# Patient Record
Sex: MALE | Race: AMERICAN INDIAN OR ALASKA NATIVE | NOT HISPANIC OR LATINO | Employment: FULL TIME | URBAN - METROPOLITAN AREA
[De-identification: names, ages, dates, MRNs, and addresses within clinical notes are randomized per-mention and may not be internally consistent; named-entity substitution may affect disease eponyms.]

---

## 2019-06-02 ENCOUNTER — HOSPITAL ENCOUNTER (INPATIENT)
Facility: MEDICAL CENTER | Age: 69
LOS: 2 days | DRG: 639 | End: 2019-06-04
Attending: EMERGENCY MEDICINE | Admitting: INTERNAL MEDICINE
Payer: COMMERCIAL

## 2019-06-02 ENCOUNTER — HOSPITAL ENCOUNTER (OUTPATIENT)
Dept: RADIOLOGY | Facility: MEDICAL CENTER | Age: 69
End: 2019-06-02

## 2019-06-02 DIAGNOSIS — E11.10 DIABETIC KETOACIDOSIS WITHOUT COMA ASSOCIATED WITH TYPE 2 DIABETES MELLITUS (HCC): ICD-10-CM

## 2019-06-02 PROBLEM — R11.2 NAUSEA AND VOMITING: Status: ACTIVE | Noted: 2019-06-02

## 2019-06-02 PROBLEM — R12 HEARTBURN: Status: ACTIVE | Noted: 2019-06-02

## 2019-06-02 LAB
ANION GAP SERPL CALC-SCNC: 16 MMOL/L (ref 0–11.9)
ANION GAP SERPL CALC-SCNC: 7 MMOL/L (ref 0–11.9)
ANION GAP SERPL CALC-SCNC: 7 MMOL/L (ref 0–11.9)
ANION GAP SERPL CALC-SCNC: 8 MMOL/L (ref 0–11.9)
ANION GAP SERPL CALC-SCNC: 9 MMOL/L (ref 0–11.9)
BASOPHILS # BLD AUTO: 0 % (ref 0–1.8)
BASOPHILS # BLD: 0 K/UL (ref 0–0.12)
BUN SERPL-MCNC: 25 MG/DL (ref 8–22)
BUN SERPL-MCNC: 27 MG/DL (ref 8–22)
BUN SERPL-MCNC: 29 MG/DL (ref 8–22)
BUN SERPL-MCNC: 32 MG/DL (ref 8–22)
BUN SERPL-MCNC: 37 MG/DL (ref 8–22)
CALCIUM SERPL-MCNC: 7.5 MG/DL (ref 8.5–10.5)
CALCIUM SERPL-MCNC: 7.6 MG/DL (ref 8.5–10.5)
CALCIUM SERPL-MCNC: 7.6 MG/DL (ref 8.5–10.5)
CALCIUM SERPL-MCNC: 7.8 MG/DL (ref 8.5–10.5)
CALCIUM SERPL-MCNC: 7.9 MG/DL (ref 8.5–10.5)
CHLORIDE SERPL-SCNC: 100 MMOL/L (ref 96–112)
CHLORIDE SERPL-SCNC: 104 MMOL/L (ref 96–112)
CHLORIDE SERPL-SCNC: 105 MMOL/L (ref 96–112)
CHLORIDE SERPL-SCNC: 105 MMOL/L (ref 96–112)
CHLORIDE SERPL-SCNC: 107 MMOL/L (ref 96–112)
CO2 SERPL-SCNC: 17 MMOL/L (ref 20–33)
CO2 SERPL-SCNC: 20 MMOL/L (ref 20–33)
CO2 SERPL-SCNC: 21 MMOL/L (ref 20–33)
CO2 SERPL-SCNC: 21 MMOL/L (ref 20–33)
CO2 SERPL-SCNC: 23 MMOL/L (ref 20–33)
CREAT SERPL-MCNC: 0.8 MG/DL (ref 0.5–1.4)
CREAT SERPL-MCNC: 0.8 MG/DL (ref 0.5–1.4)
CREAT SERPL-MCNC: 0.83 MG/DL (ref 0.5–1.4)
CREAT SERPL-MCNC: 0.89 MG/DL (ref 0.5–1.4)
CREAT SERPL-MCNC: 0.99 MG/DL (ref 0.5–1.4)
EKG IMPRESSION: NORMAL
EKG IMPRESSION: NORMAL
EOSINOPHIL # BLD AUTO: 0 K/UL (ref 0–0.51)
EOSINOPHIL NFR BLD: 0 % (ref 0–6.9)
ERYTHROCYTE [DISTWIDTH] IN BLOOD BY AUTOMATED COUNT: 46.8 FL (ref 35.9–50)
GLUCOSE BLD-MCNC: 298 MG/DL (ref 65–99)
GLUCOSE BLD-MCNC: 325 MG/DL (ref 65–99)
GLUCOSE BLD-MCNC: 357 MG/DL (ref 65–99)
GLUCOSE BLD-MCNC: 371 MG/DL (ref 65–99)
GLUCOSE SERPL-MCNC: 318 MG/DL (ref 65–99)
GLUCOSE SERPL-MCNC: 326 MG/DL (ref 65–99)
GLUCOSE SERPL-MCNC: 339 MG/DL (ref 65–99)
GLUCOSE SERPL-MCNC: 362 MG/DL (ref 65–99)
GLUCOSE SERPL-MCNC: 407 MG/DL (ref 65–99)
HCT VFR BLD AUTO: 35.4 % (ref 42–52)
HGB BLD-MCNC: 11.8 G/DL (ref 14–18)
LG PLATELETS BLD QL SMEAR: NORMAL
LYMPHOCYTES # BLD AUTO: 0.64 K/UL (ref 1–4.8)
LYMPHOCYTES NFR BLD: 6.1 % (ref 22–41)
MAGNESIUM SERPL-MCNC: 1.7 MG/DL (ref 1.5–2.5)
MANUAL DIFF BLD: NORMAL
MCH RBC QN AUTO: 30.3 PG (ref 27–33)
MCHC RBC AUTO-ENTMCNC: 33.3 G/DL (ref 33.7–35.3)
MCV RBC AUTO: 90.8 FL (ref 81.4–97.8)
METAMYELOCYTES NFR BLD MANUAL: 1.7 %
MONOCYTES # BLD AUTO: 0.46 K/UL (ref 0–0.85)
MONOCYTES NFR BLD AUTO: 4.4 % (ref 0–13.4)
MORPHOLOGY BLD-IMP: NORMAL
NEUTROPHILS # BLD AUTO: 9.22 K/UL (ref 1.82–7.42)
NEUTROPHILS NFR BLD: 87.8 % (ref 44–72)
NRBC # BLD AUTO: 0 K/UL
NRBC BLD-RTO: 0 /100 WBC
PHOSPHATE SERPL-MCNC: 3.4 MG/DL (ref 2.5–4.5)
PLATELET # BLD AUTO: 149 K/UL (ref 164–446)
PLATELET BLD QL SMEAR: NORMAL
PMV BLD AUTO: 11.1 FL (ref 9–12.9)
POTASSIUM SERPL-SCNC: 3.7 MMOL/L (ref 3.6–5.5)
POTASSIUM SERPL-SCNC: 3.9 MMOL/L (ref 3.6–5.5)
POTASSIUM SERPL-SCNC: 4.1 MMOL/L (ref 3.6–5.5)
POTASSIUM SERPL-SCNC: 4.1 MMOL/L (ref 3.6–5.5)
POTASSIUM SERPL-SCNC: 4.6 MMOL/L (ref 3.6–5.5)
RBC # BLD AUTO: 3.9 M/UL (ref 4.7–6.1)
RBC BLD AUTO: PRESENT
SODIUM SERPL-SCNC: 132 MMOL/L (ref 135–145)
SODIUM SERPL-SCNC: 133 MMOL/L (ref 135–145)
SODIUM SERPL-SCNC: 135 MMOL/L (ref 135–145)
TROPONIN I SERPL-MCNC: <0.01 NG/ML (ref 0–0.04)
WBC # BLD AUTO: 10.5 K/UL (ref 4.8–10.8)

## 2019-06-02 PROCEDURE — 93010 ELECTROCARDIOGRAM REPORT: CPT | Mod: 77 | Performed by: INTERNAL MEDICINE

## 2019-06-02 PROCEDURE — 700111 HCHG RX REV CODE 636 W/ 250 OVERRIDE (IP): Performed by: INTERNAL MEDICINE

## 2019-06-02 PROCEDURE — 85007 BL SMEAR W/DIFF WBC COUNT: CPT

## 2019-06-02 PROCEDURE — 93005 ELECTROCARDIOGRAM TRACING: CPT | Performed by: INTERNAL MEDICINE

## 2019-06-02 PROCEDURE — 770020 HCHG ROOM/CARE - TELE (206)

## 2019-06-02 PROCEDURE — 85027 COMPLETE CBC AUTOMATED: CPT

## 2019-06-02 PROCEDURE — 93005 ELECTROCARDIOGRAM TRACING: CPT | Performed by: EMERGENCY MEDICINE

## 2019-06-02 PROCEDURE — 84100 ASSAY OF PHOSPHORUS: CPT

## 2019-06-02 PROCEDURE — 700105 HCHG RX REV CODE 258: Performed by: EMERGENCY MEDICINE

## 2019-06-02 PROCEDURE — 99285 EMERGENCY DEPT VISIT HI MDM: CPT

## 2019-06-02 PROCEDURE — 84484 ASSAY OF TROPONIN QUANT: CPT

## 2019-06-02 PROCEDURE — 80048 BASIC METABOLIC PNL TOTAL CA: CPT | Mod: 91

## 2019-06-02 PROCEDURE — 36415 COLL VENOUS BLD VENIPUNCTURE: CPT

## 2019-06-02 PROCEDURE — 93010 ELECTROCARDIOGRAM REPORT: CPT | Performed by: INTERNAL MEDICINE

## 2019-06-02 PROCEDURE — 700102 HCHG RX REV CODE 250 W/ 637 OVERRIDE(OP): Performed by: INTERNAL MEDICINE

## 2019-06-02 PROCEDURE — 99223 1ST HOSP IP/OBS HIGH 75: CPT | Performed by: INTERNAL MEDICINE

## 2019-06-02 PROCEDURE — 83735 ASSAY OF MAGNESIUM: CPT

## 2019-06-02 PROCEDURE — 93005 ELECTROCARDIOGRAM TRACING: CPT

## 2019-06-02 PROCEDURE — 82962 GLUCOSE BLOOD TEST: CPT | Mod: 91

## 2019-06-02 RX ORDER — BISACODYL 10 MG
10 SUPPOSITORY, RECTAL RECTAL
Status: DISCONTINUED | OUTPATIENT
Start: 2019-06-02 | End: 2019-06-04 | Stop reason: HOSPADM

## 2019-06-02 RX ORDER — PREDNISONE 10 MG/1
10 TABLET ORAL DAILY
Status: ON HOLD | COMMUNITY
End: 2019-06-04

## 2019-06-02 RX ORDER — MAGNESIUM SULFATE HEPTAHYDRATE 40 MG/ML
4 INJECTION, SOLUTION INTRAVENOUS
Status: DISCONTINUED | OUTPATIENT
Start: 2019-06-02 | End: 2019-06-02

## 2019-06-02 RX ORDER — POLYETHYLENE GLYCOL 3350 17 G/17G
1 POWDER, FOR SOLUTION ORAL
Status: DISCONTINUED | OUTPATIENT
Start: 2019-06-02 | End: 2019-06-04 | Stop reason: HOSPADM

## 2019-06-02 RX ORDER — AMOXICILLIN 250 MG
2 CAPSULE ORAL 2 TIMES DAILY
Status: DISCONTINUED | OUTPATIENT
Start: 2019-06-02 | End: 2019-06-04 | Stop reason: HOSPADM

## 2019-06-02 RX ORDER — SODIUM CHLORIDE 9 MG/ML
2000 INJECTION, SOLUTION INTRAVENOUS ONCE
Status: DISCONTINUED | OUTPATIENT
Start: 2019-06-02 | End: 2019-06-02

## 2019-06-02 RX ORDER — SODIUM CHLORIDE, SODIUM LACTATE, POTASSIUM CHLORIDE, CALCIUM CHLORIDE 600; 310; 30; 20 MG/100ML; MG/100ML; MG/100ML; MG/100ML
INJECTION, SOLUTION INTRAVENOUS CONTINUOUS
Status: DISCONTINUED | OUTPATIENT
Start: 2019-06-02 | End: 2019-06-02

## 2019-06-02 RX ORDER — DEXTROSE AND SODIUM CHLORIDE 10; .45 G/100ML; G/100ML
INJECTION, SOLUTION INTRAVENOUS CONTINUOUS
Status: DISCONTINUED | OUTPATIENT
Start: 2019-06-02 | End: 2019-06-02

## 2019-06-02 RX ORDER — SODIUM CHLORIDE, SODIUM LACTATE, POTASSIUM CHLORIDE, AND CALCIUM CHLORIDE .6; .31; .03; .02 G/100ML; G/100ML; G/100ML; G/100ML
2000 INJECTION, SOLUTION INTRAVENOUS ONCE
Status: DISCONTINUED | OUTPATIENT
Start: 2019-06-02 | End: 2019-06-02

## 2019-06-02 RX ORDER — ACETAMINOPHEN 325 MG/1
650 TABLET ORAL EVERY 6 HOURS PRN
Status: DISCONTINUED | OUTPATIENT
Start: 2019-06-02 | End: 2019-06-04 | Stop reason: HOSPADM

## 2019-06-02 RX ORDER — SODIUM CHLORIDE 9 MG/ML
1000 INJECTION, SOLUTION INTRAVENOUS ONCE
Status: COMPLETED | OUTPATIENT
Start: 2019-06-02 | End: 2019-06-02

## 2019-06-02 RX ORDER — HEPARIN SODIUM 5000 [USP'U]/ML
5000 INJECTION, SOLUTION INTRAVENOUS; SUBCUTANEOUS EVERY 8 HOURS
Status: DISCONTINUED | OUTPATIENT
Start: 2019-06-02 | End: 2019-06-04 | Stop reason: HOSPADM

## 2019-06-02 RX ORDER — ONDANSETRON 4 MG/1
4 TABLET, ORALLY DISINTEGRATING ORAL EVERY 4 HOURS PRN
Status: DISCONTINUED | OUTPATIENT
Start: 2019-06-02 | End: 2019-06-04 | Stop reason: HOSPADM

## 2019-06-02 RX ORDER — ONDANSETRON 2 MG/ML
4 INJECTION INTRAMUSCULAR; INTRAVENOUS EVERY 4 HOURS PRN
Status: DISCONTINUED | OUTPATIENT
Start: 2019-06-02 | End: 2019-06-04 | Stop reason: HOSPADM

## 2019-06-02 RX ORDER — DEXTROSE, SODIUM CHLORIDE, SODIUM LACTATE, POTASSIUM CHLORIDE, AND CALCIUM CHLORIDE 5; .6; .31; .03; .02 G/100ML; G/100ML; G/100ML; G/100ML; G/100ML
INJECTION, SOLUTION INTRAVENOUS CONTINUOUS
Status: DISCONTINUED | OUTPATIENT
Start: 2019-06-02 | End: 2019-06-02

## 2019-06-02 RX ORDER — MAGNESIUM SULFATE HEPTAHYDRATE 40 MG/ML
2 INJECTION, SOLUTION INTRAVENOUS
Status: DISCONTINUED | OUTPATIENT
Start: 2019-06-02 | End: 2019-06-02

## 2019-06-02 RX ADMIN — HEPARIN SODIUM 5000 UNITS: 5000 INJECTION, SOLUTION INTRAVENOUS; SUBCUTANEOUS at 13:33

## 2019-06-02 RX ADMIN — HEPARIN SODIUM 5000 UNITS: 5000 INJECTION, SOLUTION INTRAVENOUS; SUBCUTANEOUS at 21:21

## 2019-06-02 RX ADMIN — INSULIN HUMAN 7 UNITS: 100 INJECTION, SOLUTION PARENTERAL at 17:23

## 2019-06-02 RX ADMIN — SODIUM CHLORIDE 1000 ML: 9 INJECTION, SOLUTION INTRAVENOUS at 04:43

## 2019-06-02 RX ADMIN — INSULIN HUMAN 12 UNITS: 100 INJECTION, SOLUTION PARENTERAL at 21:21

## 2019-06-02 RX ADMIN — INSULIN HUMAN 10 UNITS: 100 INJECTION, SOLUTION PARENTERAL at 11:50

## 2019-06-02 RX ADMIN — INSULIN HUMAN 10 UNITS: 100 INJECTION, SOLUTION PARENTERAL at 06:42

## 2019-06-02 ASSESSMENT — COGNITIVE AND FUNCTIONAL STATUS - GENERAL
SUGGESTED CMS G CODE MODIFIER MOBILITY: CH
DAILY ACTIVITIY SCORE: 24
SUGGESTED CMS G CODE MODIFIER DAILY ACTIVITY: CH
MOBILITY SCORE: 24

## 2019-06-02 ASSESSMENT — ENCOUNTER SYMPTOMS
SPUTUM PRODUCTION: 0
DIZZINESS: 0
FOCAL WEAKNESS: 0
PALPITATIONS: 0
NEUROLOGICAL NEGATIVE: 1
CHILLS: 0
DIARRHEA: 0
GASTROINTESTINAL NEGATIVE: 1
ABDOMINAL PAIN: 0
COUGH: 1
HEARTBURN: 1
BRUISES/BLEEDS EASILY: 0
CARDIOVASCULAR NEGATIVE: 1
FLANK PAIN: 0
MYALGIAS: 0
BLOOD IN STOOL: 0
HEADACHES: 0
SORE THROAT: 0
FEVER: 0
RESPIRATORY NEGATIVE: 1
SHORTNESS OF BREATH: 0
MUSCULOSKELETAL NEGATIVE: 1
CONSTITUTIONAL NEGATIVE: 1
NECK PAIN: 0
SEIZURES: 0
BACK PAIN: 0
WHEEZING: 0
NAUSEA: 1
DIAPHORESIS: 0
PSYCHIATRIC NEGATIVE: 1
VOMITING: 1
BLURRED VISION: 0

## 2019-06-02 ASSESSMENT — PATIENT HEALTH QUESTIONNAIRE - PHQ9
2. FEELING DOWN, DEPRESSED, IRRITABLE, OR HOPELESS: NOT AT ALL
SUM OF ALL RESPONSES TO PHQ9 QUESTIONS 1 AND 2: 0
1. LITTLE INTEREST OR PLEASURE IN DOING THINGS: NOT AT ALL

## 2019-06-02 ASSESSMENT — LIFESTYLE VARIABLES
EVER_SMOKED: YES
EVER_SMOKED: YES
DO YOU DRINK ALCOHOL: NO

## 2019-06-02 ASSESSMENT — COPD QUESTIONNAIRES
DO YOU EVER COUGH UP ANY MUCUS OR PHLEGM?: NO/ONLY WITH OCCASIONAL COLDS OR INFECTIONS
DO YOU EVER COUGH UP ANY MUCUS OR PHLEGM?: NO/ONLY WITH OCCASIONAL COLDS OR INFECTIONS
DURING THE PAST 4 WEEKS HOW MUCH DID YOU FEEL SHORT OF BREATH: SOME OF THE TIME
HAVE YOU SMOKED AT LEAST 100 CIGARETTES IN YOUR ENTIRE LIFE: YES
COPD SCREENING SCORE: 5
IN THE PAST 12 MONTHS DO YOU DO LESS THAN YOU USED TO BECAUSE OF YOUR BREATHING PROBLEMS: DISAGREE/UNSURE
COPD SCREENING SCORE: 5
HAVE YOU SMOKED AT LEAST 100 CIGARETTES IN YOUR ENTIRE LIFE: YES
DURING THE PAST 4 WEEKS HOW MUCH DID YOU FEEL SHORT OF BREATH: SOME OF THE TIME

## 2019-06-02 NOTE — PROGRESS NOTES
Internal Medicine Daily Progress Note    Date of Service  6/2/2019    Chief Complaint  69 y.o. male admitted 6/2/2019 with mild DKA    Hospital Course     Pt admitted with hyperglycemia, AG 16, after running out of metformin, now resolved with nl gap but glucose still 300s on insulin.  Comfortable, without evidence of acute infection currently but had been on steroids PTA for viral URI, which may have exacerbated hyperglycemia.  Not requiring IVF at this time.    Interval Problem Update  Continuing insulin    Consultants/Specialty  None    Code Status  Full    Disposition  Telemetry    Review of Systems  Review of Systems   Constitutional: Negative.    Respiratory: Negative.    Cardiovascular: Negative.    Gastrointestinal: Negative.    Musculoskeletal: Negative.    Skin: Negative.    Neurological: Negative.    Psychiatric/Behavioral: Negative.         Physical Exam  Temp:  [36.7 °C (98 °F)-37.2 °C (99 °F)] 37.2 °C (99 °F)  Pulse:  [68-82] 78  Resp:  [12-18] 17  BP: (102-115)/(52-66) 115/66  SpO2:  [92 %-97 %] 92 %    Physical Exam   Constitutional: He is oriented to person, place, and time. He appears well-developed and well-nourished. No distress.   HENT:   Head: Normocephalic and atraumatic.   Neck: Normal range of motion. Neck supple.   Cardiovascular: Normal rate and regular rhythm.    Pulmonary/Chest: Effort normal and breath sounds normal.   Abdominal: Soft. Bowel sounds are normal.   Musculoskeletal: Normal range of motion.   Neurological: He is alert and oriented to person, place, and time.   Skin: Skin is warm and dry. He is not diaphoretic.   Psychiatric: He has a normal mood and affect.       Fluids    Intake/Output Summary (Last 24 hours) at 06/02/19 1521  Last data filed at 06/02/19 1200   Gross per 24 hour   Intake             1550 ml   Output                0 ml   Net             1550 ml       Laboratory  Recent Labs      06/02/19   0430   WBC  10.5   RBC  3.90*   HEMOGLOBIN  11.8*   HEMATOCRIT   35.4*   MCV  90.8   MCH  30.3   MCHC  33.3*   RDW  46.8   PLATELETCT  149*   MPV  11.1     Recent Labs      06/02/19   0430  06/02/19   1102  06/02/19   1419   SODIUM  133*  135  135   POTASSIUM  4.6  4.1  4.1   CHLORIDE  100  107  105   CO2  17*  20  23   GLUCOSE  407*  326*  339*   BUN  37*  32*  29*   CREATININE  0.99  0.80  0.89   CALCIUM  7.6*  7.6*  7.9*                   Imaging  OUTSIDE IMAGES-DX CHEST   Final Result           Assessment/Plan  No new Assessment & Plan notes have been filed under this hospital service since the last note was generated.  Service: Internal Medicine     Diabetic ketoacidosis without coma associated with type 2 diabetes mellitus (HCC)- (present on admission)   Assessment & Plan     Mild DKA  No longer requiring IVF  Insulin via sliding scale   Resume metformin upon d/c   Heartburn- (present on admission)   Assessment & Plan     Resolved on Pepcid  GI cocktail prn    Nausea and vomiting- (present on admission)   Assessment & Plan     Resolved   Per Cardiology, does not meet criteria for STEMI but wants to check troponins.  Mild ST elevation on admission EKG.    VTE prophylaxis: heparin

## 2019-06-02 NOTE — ASSESSMENT & PLAN NOTE
DKA resolved, will add Lantus 10 units at night, check an A1c, continue sliding scale insulin, continue holding p.o. medications.  Diabetic educator pending

## 2019-06-02 NOTE — CARE PLAN
Problem: Safety  Goal: Will remain free from injury  Outcome: PROGRESSING AS EXPECTED  Pt ambulating safely in room, not connected to any equiment, not at risk of falling at this time.     Problem: Knowledge Deficit  Goal: Knowledge of disease process/condition, treatment plan, diagnostic tests, and medications will improve  Outcome: PROGRESSING AS EXPECTED  Pt understands risk factors for DKA, S&S

## 2019-06-02 NOTE — H&P
Hospital Medicine History & Physical Note    Date of Service  6/2/2019    Primary Care Physician  Pcp Pt States None    Consultants  None    Code Status  Full code    Chief Complaint  Nausea and vomiting    History of Presenting Illness  69 y.o. male with a past medical history of type 2 diabetes mellitus who presented 6/2/2019 with nausea and vomiting for the past 2 days.  The patient states that he ran out of his metformin 2 weeks ago.  He also has been suffering from a viral infection for which he was prescribed prednisone.  He reports a nonproductive cough.  He reports heartburn.  He denies any chest pain, fevers, chills, abdominal pain, diarrhea or dysuria.    I have reviewed the medical records from the transferring facility which are summarized as follows.  Sodium 131, potassium 4.8, chloride 94, CO2 16, calcium 8.2, BUN 40, creatinine 1.27, glucose 488, total protein 6.1, albumin 2.7, total bili 1.5, alk phos 61, AST 11, ALT 14  Beta hydroxybutyrate 6.6, ESR 18  WBC 11.7, hemoglobin 13.2, hematocrit 38, platelets 151  UA 3+ ketones, 3+ glucose, negative for infection  Chest x-ray: No focal infiltrate  The patient was given a liter of normal saline and 4 units of IV regular insulin prior to transfer.    Review of Systems  Review of Systems   Constitutional: Positive for malaise/fatigue. Negative for chills, diaphoresis and fever.   HENT: Negative for hearing loss and sore throat.    Eyes: Negative for blurred vision.   Respiratory: Positive for cough. Negative for sputum production, shortness of breath and wheezing.    Cardiovascular: Negative for chest pain, palpitations and leg swelling.   Gastrointestinal: Positive for heartburn, nausea and vomiting. Negative for abdominal pain, blood in stool and diarrhea.   Genitourinary: Negative for dysuria, flank pain and urgency.   Musculoskeletal: Negative for back pain, joint pain, myalgias and neck pain.   Skin: Negative for rash.   Neurological: Negative for  dizziness, focal weakness, seizures and headaches.   Endo/Heme/Allergies: Does not bruise/bleed easily.   Psychiatric/Behavioral: Negative for suicidal ideas.   All other systems reviewed and are negative.      Past Medical History   has a past medical history of Diabetes (HCC).    Surgical History  No pertinent surgical history    Family History  No pertinent family history    Social History   reports that he quit smoking about 30 years ago. His smoking use included Cigarettes. He has never used smokeless tobacco. He reports that he does not drink alcohol or use drugs.    Allergies  No Known Allergies    Medications  Prior to Admission Medications   Prescriptions Last Dose Informant Patient Reported? Taking?   B Complex Vitamins (VITAMIN B COMPLEX) Tab  Patient Yes No   Sig: Take 1 Tab by mouth every day.   Ginseng 50 MG Cap  Patient Yes No   Sig: Take 1 Cap by mouth every day.   Pumpkin Seed 500 MG Tab  Patient Yes No   Sig: Take 1 Tab by mouth every day.   glipiZIDE (GLUCOTROL) 5 MG Tab  Patient Yes No   Sig: Take 5 mg by mouth 2 times a day.   metformin (GLUCOPHAGE) 500 MG Tab  Patient Yes No   Sig: Take 500 mg by mouth 2 times a day, with meals.   predniSONE (DELTASONE) 10 MG Tab   Yes Yes   Sig: Take 10 mg by mouth every day.      Facility-Administered Medications: None       Physical Exam  Temp:  [36.7 °C (98.1 °F)] 36.7 °C (98.1 °F)  Pulse:  [68-82] 68  Resp:  [12] 12  BP: (102)/(65) 102/65  SpO2:  [96 %-97 %] 96 %    Physical Exam   Constitutional: He is oriented to person, place, and time. He appears well-developed and well-nourished. No distress.   HENT:   Head: Normocephalic and atraumatic.   Dry oral mucous membranes   Eyes: Pupils are equal, round, and reactive to light. Conjunctivae are normal. No scleral icterus.   Neck: Normal range of motion. Neck supple.   Cardiovascular: Normal rate, regular rhythm and normal heart sounds.    Pulmonary/Chest: Effort normal and breath sounds normal. No respiratory  distress. He has no wheezes. He has no rales.   Abdominal: Soft. Bowel sounds are normal. He exhibits no distension. There is no tenderness. There is no rebound.   Musculoskeletal: Normal range of motion. He exhibits no edema or tenderness.   Lymphadenopathy:     He has no cervical adenopathy.   Neurological: He is alert and oriented to person, place, and time. No cranial nerve deficit. Coordination normal.   Skin: Skin is warm. No rash noted.   Psychiatric: He has a normal mood and affect. His behavior is normal.   Nursing note and vitals reviewed.      Laboratory:      Recent Labs      06/02/19   0430   SODIUM  133*   POTASSIUM  4.6   CHLORIDE  100   CO2  17*   GLUCOSE  407*   BUN  37*   CREATININE  0.99   CALCIUM  7.6*     Recent Labs      06/02/19   0430   GLUCOSE  407*                 No results for input(s): TROPONINI in the last 72 hours.    Urinalysis:    No results found     Imaging:  OUTSIDE IMAGES-DX CHEST   Final Result            Assessment/Plan:  I anticipate this patient will require at least two midnights for appropriate medical management, necessitating inpatient admission.    Diabetic ketoacidosis without coma associated with type 2 diabetes mellitus (HCC)- (present on admission)   Assessment & Plan    Mild DKA  Patient has been started on IV fluids and subcutaneous insulin  Serial Accu-Cheks with high-dose insulin sliding scale  Monitor BMP every 4 hours  Replete electrolytes  Check HbA1c       Heartburn- (present on admission)   Assessment & Plan    I have started the patient on Pepcid  GI cocktail as needed     Nausea and vomiting- (present on admission)   Assessment & Plan    IV Zofran         VTE prophylaxis: Lovenox    I spent a total of 30 minutes of non face to face time performing additional research, reviewing medical records from transferring facility, discussing plan of care with other healthcare providers. Start time: 5 20 am. End time: 5 50 am

## 2019-06-02 NOTE — ED TRIAGE NOTES
Pt brought in by EMS as a transfer from [facility] for complaints of hyperglycemia. Pt states he ran out of metformin for approx 5 days. Pt states his blood sugar was reading high with last blood sugar reading at 448 at 0330. Pt denies any change in consciousness. Pt states he has recently been taking prednisone for aches and pains. Pt states only hx of diabetes, manages strictly with metformin.

## 2019-06-03 ENCOUNTER — APPOINTMENT (OUTPATIENT)
Dept: CARDIOLOGY | Facility: MEDICAL CENTER | Age: 69
DRG: 639 | End: 2019-06-03
Attending: HOSPITALIST
Payer: COMMERCIAL

## 2019-06-03 PROBLEM — R94.31 ABNORMAL EKG: Status: ACTIVE | Noted: 2019-06-03

## 2019-06-03 LAB
ANION GAP SERPL CALC-SCNC: 4 MMOL/L (ref 0–11.9)
ANION GAP SERPL CALC-SCNC: 5 MMOL/L (ref 0–11.9)
ANION GAP SERPL CALC-SCNC: 6 MMOL/L (ref 0–11.9)
ANION GAP SERPL CALC-SCNC: 6 MMOL/L (ref 0–11.9)
ANION GAP SERPL CALC-SCNC: 7 MMOL/L (ref 0–11.9)
ANION GAP SERPL CALC-SCNC: 7 MMOL/L (ref 0–11.9)
BUN SERPL-MCNC: 22 MG/DL (ref 8–22)
BUN SERPL-MCNC: 23 MG/DL (ref 8–22)
BUN SERPL-MCNC: 24 MG/DL (ref 8–22)
BUN SERPL-MCNC: 25 MG/DL (ref 8–22)
CALCIUM SERPL-MCNC: 7.4 MG/DL (ref 8.5–10.5)
CALCIUM SERPL-MCNC: 7.6 MG/DL (ref 8.5–10.5)
CALCIUM SERPL-MCNC: 7.8 MG/DL (ref 8.5–10.5)
CALCIUM SERPL-MCNC: 7.8 MG/DL (ref 8.5–10.5)
CHLORIDE SERPL-SCNC: 101 MMOL/L (ref 96–112)
CHLORIDE SERPL-SCNC: 102 MMOL/L (ref 96–112)
CHLORIDE SERPL-SCNC: 104 MMOL/L (ref 96–112)
CHLORIDE SERPL-SCNC: 106 MMOL/L (ref 96–112)
CO2 SERPL-SCNC: 23 MMOL/L (ref 20–33)
CO2 SERPL-SCNC: 24 MMOL/L (ref 20–33)
CO2 SERPL-SCNC: 27 MMOL/L (ref 20–33)
CREAT SERPL-MCNC: 0.71 MG/DL (ref 0.5–1.4)
CREAT SERPL-MCNC: 0.76 MG/DL (ref 0.5–1.4)
CREAT SERPL-MCNC: 0.78 MG/DL (ref 0.5–1.4)
CREAT SERPL-MCNC: 0.82 MG/DL (ref 0.5–1.4)
CREAT SERPL-MCNC: 0.85 MG/DL (ref 0.5–1.4)
CREAT SERPL-MCNC: 0.85 MG/DL (ref 0.5–1.4)
EKG IMPRESSION: NORMAL
EKG IMPRESSION: NORMAL
EST. AVERAGE GLUCOSE BLD GHB EST-MCNC: 258 MG/DL
GLUCOSE BLD-MCNC: 244 MG/DL (ref 65–99)
GLUCOSE BLD-MCNC: 259 MG/DL (ref 65–99)
GLUCOSE BLD-MCNC: 261 MG/DL (ref 65–99)
GLUCOSE BLD-MCNC: 290 MG/DL (ref 65–99)
GLUCOSE BLD-MCNC: 305 MG/DL (ref 65–99)
GLUCOSE BLD-MCNC: 316 MG/DL (ref 65–99)
GLUCOSE SERPL-MCNC: 253 MG/DL (ref 65–99)
GLUCOSE SERPL-MCNC: 254 MG/DL (ref 65–99)
GLUCOSE SERPL-MCNC: 281 MG/DL (ref 65–99)
GLUCOSE SERPL-MCNC: 289 MG/DL (ref 65–99)
GLUCOSE SERPL-MCNC: 306 MG/DL (ref 65–99)
GLUCOSE SERPL-MCNC: 352 MG/DL (ref 65–99)
HBA1C MFR BLD: 10.6 % (ref 0–5.6)
LV EJECT FRACT  99904: 60
LV EJECT FRACT MOD 2C 99903: 61.52
LV EJECT FRACT MOD 4C 99902: 62.4
LV EJECT FRACT MOD BP 99901: 60.87
POTASSIUM SERPL-SCNC: 3.4 MMOL/L (ref 3.6–5.5)
POTASSIUM SERPL-SCNC: 3.8 MMOL/L (ref 3.6–5.5)
POTASSIUM SERPL-SCNC: 3.9 MMOL/L (ref 3.6–5.5)
POTASSIUM SERPL-SCNC: 4 MMOL/L (ref 3.6–5.5)
POTASSIUM SERPL-SCNC: 4.1 MMOL/L (ref 3.6–5.5)
POTASSIUM SERPL-SCNC: 4.2 MMOL/L (ref 3.6–5.5)
SODIUM SERPL-SCNC: 131 MMOL/L (ref 135–145)
SODIUM SERPL-SCNC: 133 MMOL/L (ref 135–145)
SODIUM SERPL-SCNC: 134 MMOL/L (ref 135–145)
SODIUM SERPL-SCNC: 135 MMOL/L (ref 135–145)
SODIUM SERPL-SCNC: 136 MMOL/L (ref 135–145)
SODIUM SERPL-SCNC: 137 MMOL/L (ref 135–145)

## 2019-06-03 PROCEDURE — 36415 COLL VENOUS BLD VENIPUNCTURE: CPT

## 2019-06-03 PROCEDURE — 80048 BASIC METABOLIC PNL TOTAL CA: CPT

## 2019-06-03 PROCEDURE — 82962 GLUCOSE BLOOD TEST: CPT | Mod: 91

## 2019-06-03 PROCEDURE — 99232 SBSQ HOSP IP/OBS MODERATE 35: CPT | Performed by: HOSPITALIST

## 2019-06-03 PROCEDURE — 83036 HEMOGLOBIN GLYCOSYLATED A1C: CPT

## 2019-06-03 PROCEDURE — 93010 ELECTROCARDIOGRAM REPORT: CPT | Performed by: INTERNAL MEDICINE

## 2019-06-03 PROCEDURE — 770020 HCHG ROOM/CARE - TELE (206)

## 2019-06-03 PROCEDURE — 93005 ELECTROCARDIOGRAM TRACING: CPT | Performed by: HOSPITALIST

## 2019-06-03 PROCEDURE — 93306 TTE W/DOPPLER COMPLETE: CPT | Mod: 26 | Performed by: INTERNAL MEDICINE

## 2019-06-03 PROCEDURE — 93306 TTE W/DOPPLER COMPLETE: CPT

## 2019-06-03 PROCEDURE — 700102 HCHG RX REV CODE 250 W/ 637 OVERRIDE(OP): Performed by: HOSPITALIST

## 2019-06-03 PROCEDURE — 700111 HCHG RX REV CODE 636 W/ 250 OVERRIDE (IP): Performed by: INTERNAL MEDICINE

## 2019-06-03 RX ORDER — INSULIN GLARGINE 100 [IU]/ML
10 INJECTION, SOLUTION SUBCUTANEOUS EVERY EVENING
Status: DISCONTINUED | OUTPATIENT
Start: 2019-06-03 | End: 2019-06-04 | Stop reason: HOSPADM

## 2019-06-03 RX ADMIN — INSULIN HUMAN 10 UNITS: 100 INJECTION, SOLUTION PARENTERAL at 17:41

## 2019-06-03 RX ADMIN — INSULIN HUMAN 4 UNITS: 100 INJECTION, SOLUTION PARENTERAL at 05:50

## 2019-06-03 RX ADMIN — INSULIN GLARGINE 10 UNITS: 100 INJECTION, SOLUTION SUBCUTANEOUS at 17:41

## 2019-06-03 RX ADMIN — HEPARIN SODIUM 5000 UNITS: 5000 INJECTION, SOLUTION INTRAVENOUS; SUBCUTANEOUS at 21:58

## 2019-06-03 RX ADMIN — HEPARIN SODIUM 5000 UNITS: 5000 INJECTION, SOLUTION INTRAVENOUS; SUBCUTANEOUS at 05:45

## 2019-06-03 RX ADMIN — INSULIN HUMAN 7 UNITS: 100 INJECTION, SOLUTION PARENTERAL at 11:56

## 2019-06-03 RX ADMIN — INSULIN HUMAN 7 UNITS: 100 INJECTION, SOLUTION PARENTERAL at 22:00

## 2019-06-03 ASSESSMENT — ENCOUNTER SYMPTOMS
HEMOPTYSIS: 0
CHILLS: 0
HEARTBURN: 1
MYALGIAS: 0
NECK PAIN: 0
ABDOMINAL PAIN: 0
FEVER: 0
ORTHOPNEA: 0
DEPRESSION: 0
NAUSEA: 0
SINUS PAIN: 0
BLURRED VISION: 0
PALPITATIONS: 0
VOMITING: 0
DIZZINESS: 0
COUGH: 0

## 2019-06-03 NOTE — ASSESSMENT & PLAN NOTE
Repeated EKG today stable, still showing minimal ST elevation inferior leads, troponins negative, will get echocardiogram, no chest pain.  Previous hospitalist discussed case with cardiology and not concerned about ACS

## 2019-06-03 NOTE — PROGRESS NOTES
Internal Medicine Daily Progress Note    Date of Service  6/3/2019    Chief Complaint  69 y.o. male admitted 6/2/2019 with mild DKA    Hospital Course     Pt admitted with hyperglycemia, AG 16, after running out of metformin, now resolved with nl gap but glucose still 300s on insulin.  Comfortable, without evidence of acute infection currently but had been on steroids PTA for viral URI, which may have exacerbated hyperglycemia.  Not requiring IVF at this time.    Interval Problem Update      Patient is resting in bed, denies any chest pain shortness of breath palpitations, heartburn is improved, blood sugar is still elevated and then made 200s, will add Lantus 10 units, A1c is pending, EKG repeated this morning stable from previous EKG, will check echocardiogram, at this time patient does not have any cardiac complaints, will check lipid panel in a.m. May need to start statin and low-dose aspirin if able to tolerate due to his diabetes.    Consultants/Specialty  None    Code Status  Full    Disposition  Home when stable    Review of Systems  Review of Systems   Constitutional: Negative for chills and fever.   HENT: Negative for congestion and sinus pain.    Eyes: Negative for blurred vision.   Respiratory: Negative for cough and hemoptysis.    Cardiovascular: Negative for chest pain, palpitations and orthopnea.   Gastrointestinal: Positive for heartburn (Improved). Negative for abdominal pain, nausea and vomiting.   Genitourinary: Negative for dysuria and urgency.   Musculoskeletal: Negative for myalgias and neck pain.   Skin: Negative for itching.   Neurological: Negative for dizziness.   Psychiatric/Behavioral: Negative for depression.        Physical Exam  Temp:  [36.4 °C (97.5 °F)-37.3 °C (99.1 °F)] 36.5 °C (97.7 °F)  Pulse:  [60-82] 64  Resp:  [16-18] 16  BP: (103-127)/(57-68) 103/61  SpO2:  [91 %-96 %] 95 %    Physical Exam   Constitutional: He is oriented to person, place, and time. He appears well-developed  and well-nourished. No distress.   HENT:   Head: Normocephalic and atraumatic.   Mouth/Throat: No oropharyngeal exudate.   Eyes: Conjunctivae are normal. No scleral icterus.   Neck: Normal range of motion. Neck supple.   Cardiovascular: Normal rate and regular rhythm.  Exam reveals no friction rub.    Pulmonary/Chest: Effort normal and breath sounds normal. No respiratory distress. He has no wheezes.   Abdominal: Soft. Bowel sounds are normal. He exhibits no distension. There is no tenderness. There is no rebound.   Musculoskeletal: Normal range of motion.   Lymphadenopathy:     He has no cervical adenopathy.   Neurological: He is alert and oriented to person, place, and time.   Skin: Skin is warm and dry.   Psychiatric: He has a normal mood and affect.   Nursing note and vitals reviewed.      Fluids    Intake/Output Summary (Last 24 hours) at 06/03/19 1450  Last data filed at 06/03/19 0953   Gross per 24 hour   Intake              960 ml   Output                0 ml   Net              960 ml       Laboratory  Recent Labs      06/02/19   0430   WBC  10.5   RBC  3.90*   HEMOGLOBIN  11.8*   HEMATOCRIT  35.4*   MCV  90.8   MCH  30.3   MCHC  33.3*   RDW  46.8   PLATELETCT  149*   MPV  11.1     Recent Labs      06/03/19   0146  06/03/19   0537  06/03/19   1013   SODIUM  135  136  137   POTASSIUM  3.4*  3.9  3.8   CHLORIDE  106  106  106   CO2  24  23  24   GLUCOSE  289*  281*  253*   BUN  23*  23*  22   CREATININE  0.76  0.71  0.78   CALCIUM  7.4*  7.6*  7.6*                   Imaging  OUTSIDE IMAGES-DX CHEST   Final Result      EC-ECHOCARDIOGRAM COMPLETE W/O CONT    (Results Pending)        Assessment/Plan  Diabetic ketoacidosis without coma associated with type 2 diabetes mellitus (HCC)- (present on admission)   Assessment & Plan    DKA resolved, will add Lantus 10 units at night, check an A1c, continue sliding scale insulin, continue holding p.o. medications.  Diabetic educator pending       Abnormal EKG- (present on  admission)   Assessment & Plan    Repeated EKG today stable, still showing minimal ST elevation inferior leads, troponins negative, will get echocardiogram, no chest pain.  Previous hospitalist discussed case with cardiology and not concerned about ACS     Heartburn- (present on admission)   Assessment & Plan    Improved     Nausea and vomiting- (present on admission)   Assessment & Plan    Resolved        Diabetic ketoacidosis without coma associated with type 2 diabetes mellitus (HCC)- (present on admission)   Assessment & Plan     Mild DKA  No longer requiring IVF  Insulin via sliding scale   Resume metformin upon d/c   Heartburn- (present on admission)   Assessment & Plan     Resolved on Pepcid  GI cocktail prn    Nausea and vomiting- (present on admission)   Assessment & Plan     Resolved       VTE prophylaxis: heparin

## 2019-06-03 NOTE — CARE PLAN
Problem: Infection  Goal: Will remain free from infection  Outcome: PROGRESSING AS EXPECTED  Patient WBC within normal limits. No open wounds noted on patient. Patient does not complain of SOB. Patient vital signs are stable.       Problem: Knowledge Deficit  Goal: Knowledge of disease process/condition, treatment plan, diagnostic tests, and medications will improve  Outcome: PROGRESSING AS EXPECTED  Patient is educated of disease process and condition. Treatment team has included patient in plan of care. All medications indications and side effects are explained. Patient is encouraged to ask questions. Patient indicates understanding.

## 2019-06-04 VITALS
HEIGHT: 69 IN | HEART RATE: 62 BPM | DIASTOLIC BLOOD PRESSURE: 69 MMHG | OXYGEN SATURATION: 94 % | SYSTOLIC BLOOD PRESSURE: 130 MMHG | WEIGHT: 178.35 LBS | TEMPERATURE: 98 F | BODY MASS INDEX: 26.42 KG/M2 | RESPIRATION RATE: 18 BRPM

## 2019-06-04 LAB
ANION GAP SERPL CALC-SCNC: 3 MMOL/L (ref 0–11.9)
ANION GAP SERPL CALC-SCNC: 4 MMOL/L (ref 0–11.9)
ANION GAP SERPL CALC-SCNC: 4 MMOL/L (ref 0–11.9)
ANION GAP SERPL CALC-SCNC: 5 MMOL/L (ref 0–11.9)
BUN SERPL-MCNC: 17 MG/DL (ref 8–22)
BUN SERPL-MCNC: 18 MG/DL (ref 8–22)
BUN SERPL-MCNC: 19 MG/DL (ref 8–22)
BUN SERPL-MCNC: 20 MG/DL (ref 8–22)
CALCIUM SERPL-MCNC: 7.4 MG/DL (ref 8.5–10.5)
CALCIUM SERPL-MCNC: 7.5 MG/DL (ref 8.5–10.5)
CALCIUM SERPL-MCNC: 7.7 MG/DL (ref 8.5–10.5)
CALCIUM SERPL-MCNC: 7.8 MG/DL (ref 8.5–10.5)
CHLORIDE SERPL-SCNC: 100 MMOL/L (ref 96–112)
CHLORIDE SERPL-SCNC: 104 MMOL/L (ref 96–112)
CHLORIDE SERPL-SCNC: 105 MMOL/L (ref 96–112)
CHLORIDE SERPL-SCNC: 106 MMOL/L (ref 96–112)
CHOLEST SERPL-MCNC: 180 MG/DL (ref 100–199)
CO2 SERPL-SCNC: 27 MMOL/L (ref 20–33)
CO2 SERPL-SCNC: 28 MMOL/L (ref 20–33)
CREAT SERPL-MCNC: 0.7 MG/DL (ref 0.5–1.4)
CREAT SERPL-MCNC: 0.71 MG/DL (ref 0.5–1.4)
CREAT SERPL-MCNC: 0.76 MG/DL (ref 0.5–1.4)
CREAT SERPL-MCNC: 0.87 MG/DL (ref 0.5–1.4)
GLUCOSE BLD-MCNC: 112 MG/DL (ref 65–99)
GLUCOSE BLD-MCNC: 135 MG/DL (ref 65–99)
GLUCOSE BLD-MCNC: 238 MG/DL (ref 65–99)
GLUCOSE SERPL-MCNC: 125 MG/DL (ref 65–99)
GLUCOSE SERPL-MCNC: 144 MG/DL (ref 65–99)
GLUCOSE SERPL-MCNC: 245 MG/DL (ref 65–99)
GLUCOSE SERPL-MCNC: 286 MG/DL (ref 65–99)
HDLC SERPL-MCNC: 21 MG/DL
LDLC SERPL CALC-MCNC: 133 MG/DL
POTASSIUM SERPL-SCNC: 3.6 MMOL/L (ref 3.6–5.5)
POTASSIUM SERPL-SCNC: 3.6 MMOL/L (ref 3.6–5.5)
POTASSIUM SERPL-SCNC: 3.9 MMOL/L (ref 3.6–5.5)
POTASSIUM SERPL-SCNC: 3.9 MMOL/L (ref 3.6–5.5)
SODIUM SERPL-SCNC: 133 MMOL/L (ref 135–145)
SODIUM SERPL-SCNC: 135 MMOL/L (ref 135–145)
SODIUM SERPL-SCNC: 135 MMOL/L (ref 135–145)
SODIUM SERPL-SCNC: 137 MMOL/L (ref 135–145)
TRIGL SERPL-MCNC: 131 MG/DL (ref 0–149)

## 2019-06-04 PROCEDURE — 36415 COLL VENOUS BLD VENIPUNCTURE: CPT

## 2019-06-04 PROCEDURE — 700111 HCHG RX REV CODE 636 W/ 250 OVERRIDE (IP): Performed by: INTERNAL MEDICINE

## 2019-06-04 PROCEDURE — 82962 GLUCOSE BLOOD TEST: CPT | Mod: 91

## 2019-06-04 PROCEDURE — 99239 HOSP IP/OBS DSCHRG MGMT >30: CPT | Performed by: HOSPITALIST

## 2019-06-04 PROCEDURE — 700111 HCHG RX REV CODE 636 W/ 250 OVERRIDE (IP): Performed by: HOSPITALIST

## 2019-06-04 PROCEDURE — 80061 LIPID PANEL: CPT

## 2019-06-04 PROCEDURE — 90471 IMMUNIZATION ADMIN: CPT

## 2019-06-04 PROCEDURE — 80048 BASIC METABOLIC PNL TOTAL CA: CPT | Mod: 91

## 2019-06-04 PROCEDURE — 90670 PCV13 VACCINE IM: CPT | Performed by: HOSPITALIST

## 2019-06-04 RX ORDER — ATORVASTATIN CALCIUM 20 MG/1
20 TABLET, FILM COATED ORAL DAILY
Qty: 30 TAB | Refills: 2 | Status: SHIPPED | OUTPATIENT
Start: 2019-06-04

## 2019-06-04 RX ADMIN — INSULIN HUMAN 4 UNITS: 100 INJECTION, SOLUTION PARENTERAL at 11:54

## 2019-06-04 RX ADMIN — PNEUMOCOCCAL 13-VALENT CONJUGATE VACCINE 0.5 ML: 2.2; 2.2; 2.2; 2.2; 2.2; 4.4; 2.2; 2.2; 2.2; 2.2; 2.2; 2.2; 2.2 INJECTION, SUSPENSION INTRAMUSCULAR at 15:58

## 2019-06-04 RX ADMIN — HEPARIN SODIUM 5000 UNITS: 5000 INJECTION, SOLUTION INTRAVENOUS; SUBCUTANEOUS at 06:10

## 2019-06-04 NOTE — DISCHARGE INSTRUCTIONS
Discharge Instructions    Discharged to home by car with relative. Discharged via wheelchair, hospital escort: Yes.  Special equipment needed: Not Applicable    Be sure to schedule a follow-up appointment with your primary care doctor or any specialists as instructed.     Discharge Plan:   Influenza Vaccine Indication: Patient Refuses    I understand that a diet low in cholesterol, fat, and sodium is recommended for good health. Unless I have been given specific instructions below for another diet, I accept this instruction as my diet prescription.   Other diet: Diabetic    Special Instructions: None    · Is patient discharged on Warfarin / Coumadin?   No     Depression / Suicide Risk    As you are discharged from this [facility] facility, it is important to learn how to keep safe from harming yourself.    Recognize the warning signs:  · Abrupt changes in personality, positive or negative- including increase in energy   · Giving away possessions  · Change in eating patterns- significant weight changes-  positive or negative  · Change in sleeping patterns- unable to sleep or sleeping all the time   · Unwillingness or inability to communicate  · Depression  · Unusual sadness, discouragement and loneliness  · Talk of wanting to die  · Neglect of personal appearance   · Rebelliousness- reckless behavior  · Withdrawal from people/activities they love  · Confusion- inability to concentrate     If you or a loved one observes any of these behaviors or has concerns about self-harm, here's what you can do:  · Talk about it- your feelings and reasons for harming yourself  · Remove any means that you might use to hurt yourself (examples: pills, rope, extension cords, firearm)  · Get professional help from the community (Mental Health, Substance Abuse, psychological counseling)  · Do not be alone:Call your Safe Contact- someone whom you trust who will be there for you.  · Call your local CRISIS HOTLINE 912-8430 or  537.392.2967  · Call your local Children's Mobile Crisis Response Team [place] (903) 092-6145 or www.BuyHappy  · Call the toll free National Suicide Prevention Hotlines   · National Suicide Prevention Lifeline 756-422-ASQN (3636)  · [place] Hope Line Network 800-SUICIDE (174-0737)      Diabetic Ketoacidosis  Diabetic ketoacidosis is a life-threatening complication of diabetes. If it is not treated, it can cause severe dehydration and organ damage and can lead to a coma or death.  What are the causes?  This condition develops when there is not enough of the hormone insulin in the body. Insulin helps the body to break down sugar for energy. Without insulin, the body cannot break down sugar, so it breaks down fats instead. This leads to the production of acids that are called ketones. Ketones are poisonous at high levels.  This condition can be triggered by:  · Stress on the body that is brought on by an illness.  · Medicines that raise blood glucose levels.  · Not taking diabetes medicine.  What are the signs or symptoms?  Symptoms of this condition include:  · Fatigue.  · Weight loss.  · Excessive thirst.  · Light-headedness.  · Fruity or sweet-smelling breath.  · Excessive urination.  · Vision changes.  · Confusion or irritability.  · Nausea.  · Vomiting.  · Rapid breathing.  · Abdominal pain.  · Feeling flushed.  How is this diagnosed?  This condition is diagnosed based on a medical history, a physical exam, and blood tests. You may also have a urine test that checks for ketones.  How is this treated?  This condition may be treated with:  · Fluid replacement. This may be done to correct dehydration.  · Insulin injections. These may be given through the skin or through an IV tube.  · Electrolyte replacement. Electrolytes, such as potassium and sodium, may be given in pill form or through an IV tube.  · Antibiotic medicines. These may be prescribed if your condition was caused by an infection.  Follow  these instructions at home:  Eating and drinking  · Drink enough fluids to keep your urine clear or pale yellow.  · If you cannot eat, alternate between drinking fluids with sugar (such as juice) and salty fluids (such as broth or bouillon).  · If you can eat, follow your usual diet and drink sugar-free liquids, such as water.  Other Instructions   · Take insulin as directed by your health care provider. Do not skip insulin injections. Do not use  insulin.  · If your blood sugar is over 240 mg/dL, monitor your urine ketones every 4-6 hours.  · If you were prescribed an antibiotic medicine, finish all of it even if you start to feel better.  · Rest and exercise only as directed by your health care provider.  · If you get sick, call your health care provider and begin treatment quickly. Your body often needs extra insulin to fight an illness.  · Check your blood glucose levels regularly. If your blood glucose is high, drink plenty of fluids. This helps to flush out ketones.  Contact a health care provider if:  · Your blood glucose level is too high or too low.  · You have ketones in your urine.  · You have a fever.  · You cannot eat.  · You cannot tolerate fluids.  · You have been vomiting for more than 2 hours.  · You continue to have symptoms of this condition.  · You develop new symptoms.  Get help right away if:  · Your blood glucose levels continue to be high (elevated).  · Your monitor reads “high” even when you are taking insulin.  · You faint.  · You have chest pain.  · You have trouble breathing.  · You have a sudden, severe headache.  · You have sudden weakness in one arm or one leg.  · You have sudden trouble speaking or swallowing.  · You have vomiting or diarrhea that gets worse after 3 hours.  · You feel severely fatigued.  · You have trouble thinking.  · You have abdominal pain.  · You are severely dehydrated. Symptoms of severe dehydration include:  ¨ Extreme thirst.  ¨ Dry mouth.  ¨ Blue  lips.  ¨ Cold hands and feet.  ¨ Rapid breathing.  This information is not intended to replace advice given to you by your health care provider. Make sure you discuss any questions you have with your health care provider.  Document Released: 12/15/2001 Document Revised: 05/25/2017 Document Reviewed: 11/25/2015  [employer] Interactive Patient Education © 2017 Elsevier Inc.    Insulin Glargine injection  What is this medicine?  INSULIN GLARGINE (IN tam rios GLAELVIN thompsonn) is a human-made form of insulin. This drug lowers the amount of sugar in your blood. It is a long-acting insulin that is usually given once a day.  This medicine may be used for other purposes; ask your health care provider or pharmacist if you have questions.  COMMON BRAND NAME(S): BASAGLAR, Lantus, Lantus SoloStar, Toujeo SoloStar  What should I tell my health care provider before I take this medicine?  They need to know if you have any of these conditions:  -episodes of hypoglycemia  -kidney disease  -liver disease  -an unusual or allergic reaction to insulin, metacresol, other medicines, foods, dyes, or preservatives  -pregnant or trying to get pregnant  -breast-feeding  How should I use this medicine?  This medicine is for injection under the skin. Use this medicine at the same time each day. Use exactly as directed. This insulin should never be mixed in the same syringe with other insulins before injection. Do not vigorously shake before use. You will be taught how to use this medicine and how to adjust doses for activities and illness. Do not use more insulin than prescribed.  Always check the appearance of your insulin before using it. This medicine should be clear and colorless like water. Do not use it if it is cloudy, thickened, colored, or has solid particles in it.  It is important that you put your used needles and syringes in a special sharps container. Do not put them in a trash can. If you do not have a sharps container, call your pharmacist  or healthcare provider to get one.  Talk to your pediatrician regarding the use of this medicine in children. Special care may be needed.  Overdosage: If you think you have taken too much of this medicine contact a poison control center or emergency room at once.  NOTE: This medicine is only for you. Do not share this medicine with others.  What if I miss a dose?  It is important not to miss a dose. Your health care professional or doctor should discuss a plan for missed doses with you. If you do miss a dose, follow their plan. Do not take double doses.  What may interact with this medicine?  -other medicines for diabetes  Many medications may cause an increase or decrease in blood sugar, these include:  -alcohol containing beverages  -aspirin and aspirin-like drugs  -chloramphenicol  -chromium  -diuretics  -female hormones, like estrogens or progestins and birth control pills  -heart medicines  -isoniazid  -male hormones or anabolic steroids  -medicines for weight loss  -medicines for allergies, asthma, cold, or cough  -medicines for mental problems  -medicines called MAO Inhibitors like Nardil, Parnate, Marplan, Eldepryl  -niacin  -NSAIDs, medicines for pain and inflammation, like ibuprofen or naproxen  -pentamidine  -phenytoin  -probenecid  -quinolone antibiotics like ciprofloxacin, levofloxacin, ofloxacin  -some herbal dietary supplements  -steroid medicines like prednisone or cortisone  -thyroid medicine  Some medications can hide the warning symptoms of low blood sugar. You may need to monitor your blood sugar more closely if you are taking one of these medications. These include:  -beta-blockers such as atenolol, metoprolol, propranolol  -clonidine  -guanethidine  -reserpine  This list may not describe all possible interactions. Give your health care provider a list of all the medicines, herbs, non-prescription drugs, or dietary supplements you use. Also tell them if you smoke, drink alcohol, or use illegal  drugs. Some items may interact with your medicine.  What should I watch for while using this medicine?  Visit your health care professional or doctor for regular checks on your progress.  Do not drive, use machinery, or do anything that needs mental alertness until you know how this medicine affects you. Alcohol may interfere with the effect of this medicine. Avoid alcoholic drinks.  A test called the HbA1C (A1C) will be monitored. This is a simple blood test. It measures your blood sugar control over the last 2 to 3 months. You will receive this test every 3 to 6 months.  Learn how to check your blood sugar. Learn the symptoms of low and high blood sugar and how to manage them.  Always carry a quick-source of sugar with you in case you have symptoms of low blood sugar. Examples include hard sugar candy or glucose tablets. Make sure others know that you can choke if you eat or drink when you develop serious symptoms of low blood sugar, such as seizures or unconsciousness. They must get medical help at once.  Tell your doctor or health care professional if you have high blood sugar. You might need to change the dose of your medicine. If you are sick or exercising more than usual, you might need to change the dose of your medicine.  Do not skip meals. Ask your doctor or health care professional if you should avoid alcohol. Many nonprescription cough and cold products contain sugar or alcohol. These can affect blood sugar.  Make sure that you have the right kind of syringe for the type of insulin you use. Try not to change the brand and type of insulin or syringe unless your health care professional or doctor tells you to. Switching insulin brand or type can cause dangerously high or low blood sugar. Always keep an extra supply of insulin, syringes, and needles on hand. Use a syringe one time only. Throw away syringe and needle in a closed container to prevent accidental needle sticks.  Insulin pens and cartridges  should never be shared. Even if the needle is changed, sharing may result in passing of viruses like hepatitis or HIV.  Wear a medical ID bracelet or chain, and carry a card that describes your disease and details of your medicine and dosage times.  What side effects may I notice from receiving this medicine?  Side effects that you should report to your doctor or health care professional as soon as possible:  -allergic reactions like skin rash, itching or hives, swelling of the face, lips, or tongue  -breathing problems  -signs and symptoms of high blood sugar such as dizziness, dry mouth, dry skin, fruity breath, nausea, stomach pain, increased hunger or thirst, increased urination  -signs and symptoms of low blood sugar such as feeling anxious, confusion, dizziness, increased hunger, unusually weak or tired, sweating, shakiness, cold, irritable, headache, blurred vision, fast heartbeat, loss of consciousness  Side effects that usually do not require medical attention (report to your doctor or health care professional if they continue or are bothersome):  -increase or decrease in fatty tissue under the skin due to overuse of a particular injection site  -itching, burning, swelling, or rash at site where injected  This list may not describe all possible side effects. Call your doctor for medical advice about side effects. You may report side effects to FDA at 1-790-FDA-1637.  Where should I keep my medicine?  Keep out of the reach of children.  Store unopened vials in a refrigerator between 2 and 8 degrees C (36 and 46 degrees F). Do not freeze or use if the insulin has been frozen. Opened vials (vials currently in use) may be stored in the refrigerator or at room temperature, at approximately 25 degrees C (77 degrees F) or cooler. Keeping your insulin at room temperature decreases the amount of pain during injection. Once opened, your insulin can be used for 28 days. After 28 days, the vial should be thrown  away.  Store Lantus Solostar Pens or Basaglar KwikPens in a refrigerator between 2 and 8 degrees C (36 and 46 degrees F) or at room temperature below 30 degrees C (86 degrees F). Do not freeze or use if the insulin has been frozen. Once opened, the pens should be kept at room temperature. Do not store in the refrigerator once opened. Once opened, the insulin can be used for 28 days. After 28 days, the Lantus Solostar Pen or Basaglar KwikPen should be thrown away.  Store Toujeo Solostar Pens in a refrigerator between 2 and 8 degrees C (36 and 46 degrees F). Do not freeze or use if the insulin has been frozen. Once opened, the pens should be kept at room temperature below 30 degrees C (86 degrees F). Do not store in the refrigerator once opened. Once opened, the insulin can be used for 42 days. After 42 days, the Toujeo Solostar Pen should be thrown away.  Protect all insulin vials and pens from light and excessive heat. Throw away any unused medicine after the expiration date or after the specified time for room temperature storage has passed.  NOTE: This sheet is a summary. It may not cover all possible information. If you have questions about this medicine, talk to your doctor, pharmacist, or health care provider.  © 2018 Elsevier/[employer] (2017-01-19 10:19:14)

## 2019-06-04 NOTE — PROGRESS NOTES
Diabetes education: Met with patient and wife this afternoon. Please see consult note.  Plan: Pt has a meter and test strips at home from [facility], but will be up lancets from [facility]. Pt needs prescriptions for Lantus solostar pen and carmina pen needles ( 4 mm box of 100 ) at discharge. Please call [email or web address] if needs change.  CDE called [facility] [address] [place] [address]. Pt has not been there so they will not be able to check coverage until pt comes with insurance card ( they cannot check with insurance info over the phone). Pt will call nurse to call Dr. Zimmer if insulin not covered ( if he needs Levemir flex pen, or Basaglar kwik pen instead of Lantus solostar).

## 2019-06-04 NOTE — CARE PLAN
Problem: Communication  Goal: The ability to communicate needs accurately and effectively will improve  Outcome: PROGRESSING AS EXPECTED    Intervention: [place] patient and significant other/support system to call light to alert staff of needs   06/04/19 0900   OTHER   Oriented to: All of the Following : Location of Bathroom, Visiting Policy, Unit Routine, Call Light and Bedside Controls, Bedside Rail Policy, Smoking Policy, Rights and Responsibilities, Bedside Report, and Patient Education Notebook         Problem: Safety  Goal: Will remain free from falls  Outcome: PROGRESSING AS EXPECTED  Fall risk assessed, fall precautions in place, bed alarm on, pt verbalizes understanding of fall risk.

## 2019-06-04 NOTE — PROGRESS NOTES
Bedside report received, pt care assumed, tele box on.  Pt denies any additional needs at this time. Bed in lowest position, pt educated on fall risk and verbalized understanding, call light within reach.

## 2019-06-04 NOTE — CONSULTS
Diabetes education: Met with pt and wife regarding diabetes management. Pt has a hx of diabetes on Metformin prior to admit. Pt ran out of Metformin, followed up with [facility] ( he said he was sick), was placed on steroids. Pt states even when he was on Metformin his blood sugars were in the 200's . Pt was not on Glipizide at home.  Pt was admitted with blood sugar of 407 and Hg a1c of 10.6%.  Pt was on Lantus 10 units pm and regular insulin sliding scale coverage ac and hs with blood sugars of 259( 7 units), 135 , and 238 ( 4 units). Pt to restart Metformin at 1000 mg BID meals.  Pt did not want to add more agents but preferred insulin. Pt taught to use insulin pens and practiced with saline pens.   Reviewed diabetes, what effects blood sugars, hyperglycemia, hypoglycemia, need to eat three meals and hs snack, exercise, stress management and goals for blood sugars.  Pt was given [facility] dm book by rd and reviewed by CDE. Questions answered. Pt needs to get prescriptions sent to [facility] [address] [place] [address] as [facility] and their local pharmacy will close before they are able to get the prescriptions. Nursing to give ice pack for transportation of insulin pens.  Plan: Pt has a meter and test strips at home from [facility], but will be up lancets from [facility]. Pt needs prescriptions for Lantus solostar pen and carmina pen needles ( 4 mm box of 100 ) at discharge. Please call [phone] if needs change.

## 2019-06-04 NOTE — CARE PLAN
Problem: Safety  Goal: Will remain free from injury  Outcome: PROGRESSING AS EXPECTED  Patient's risk for injury and falls assessed. Appropriate safety precautions in place. Patient educated to utilize call light for needs. Patient verbalizes understanding.      Problem: Infection  Goal: Will remain free from infection  Outcome: PROGRESSING AS EXPECTED  Patient WBC within normal limits. No open wounds noted on patient. Patient does not complain of SOB. Patient vital signs are stable.

## 2019-06-04 NOTE — PROGRESS NOTES
Bedside report received, pt care assumed, tele box on. VSS, pt assessment complete. Pt aaox4, no signs of distress noted at this time. POC discussed with pt and verbalizes no questions. Pt denies any additional needs at this time. Bed in lowest position, pt educated on fall risk and verbalized understanding, call light within reach.

## 2019-06-05 NOTE — PROGRESS NOTES
Discharge instructions given to patient. Prescriptions given to patient. Discharge instructions given to patient at bedside, verbalizes understanding and states plans for follow-up with PCP. New and home medication review, post-discharge activity level and worsening of symptoms needing follow-up care discussed. Telemetry monitor/IV cathlon removed. All belongings accounted for, all questions answered at this time. Patient refused wheelchair and escort out, patient wants to walk out with wife. Pt did not want me to administer his Lantus or metformin before he goes home. Pt wanted to take when he gets home.

## 2019-06-05 NOTE — DISCHARGE SUMMARY
Hospital Medicine Discharge Note     Admit Date:  6/2/2019       Discharge Date:   6/4/2019    Attending Physician:  Collin Zimmer     Diagnoses (includes active and resolved):   Active Problems:    Diabetic ketoacidosis without coma associated with type 2 diabetes mellitus (HCC) POA: Yes    Nausea and vomiting POA: Yes    Heartburn POA: Yes    Abnormal EKG POA: Yes  Resolved Problems:    * No resolved hospital problems. *      Hospital Summary (Brief Narrative):       69 y.o. male with a past medical history of type 2 diabetes mellitus who presented 6/2/2019 with nausea and vomiting x 2 days.  The patient states that he ran out of his metformin x 3 weeks.  He also has been suffering from a viral infection for which he was prescribed prednisone.  Patient was admitted for mild DKA.  He improved via insulin sliding scale as well as IV fluids.  After further questioning, he reveals that his blood sugars ran in the 200s even prior to running out of metformin.  He said he tried other oral anti-hypoglycemics such as glipizide in the past and did not like them and does not want to try them again.  He was okay with starting Lantus.  Thus he received diabetic education and was prescribed a Lantus pen.  He already has a meter as well as test strips at home.  He will follow-up with [facility] for further diabetic medication adjustment.  Patient was also found to have elevated LDL and was thus started on atorvastatin which he agreed to.     The patient met 2-midnight criteria for an inpatient stay at the time of discharge.     Consultants:      Diabetic education    Procedures:        None    Discharge Medications:           Medication List      START taking these medications      Instructions   atorvastatin 20 MG Tabs  Commonly known as:  LIPITOR   Take 1 Tab by mouth every day.  Dose:  20 mg     insulin glargine 100 UNIT/ML Sopn injection  Commonly known as:  LANTUS   Inject 10 Units as instructed every evening.  Dose:   10 Units     Insulin Pen Needle 32 G x 4 mm   Doctor's comments:  Per patient/formulary preference. ICD-10 code: E11.65 Uncontrolled type 2 Diabetes Mellitus  Use one pen needle in pen device to inject insulin once daily.        CHANGE how you take these medications      Instructions   metformin 1000 MG tablet  What changed:  · medication strength  · how much to take  Commonly known as:  GLUCOPHAGE   Take 1 Tab by mouth 2 times a day, with meals.  Dose:  1000 mg        CONTINUE taking these medications      Instructions   Ginseng 50 MG Caps   Take 1 Cap by mouth every day.  Dose:  1 Cap     Pumpkin Seed 500 MG Tabs   Take 1 Tab by mouth every day.  Dose:  1 Tab     Vitamin B Complex Tabs   Take 1 Tab by mouth every day.  Dose:  1 Tab        STOP taking these medications    glipiZIDE 5 MG Tabs  Commonly known as:  GLUCOTROL     predniSONE 10 MG Tabs  Commonly known as:  DELTASONE          Disposition:   Discharge home    Activity:   As tolerated    Code status:   Full code    Primary Care Provider:    Pcp Pt States None    Follow up appointment details :      ALEKSANDER Roman  [address] [facility] [id number] [phone]    Call in 1 week  For hospital follow-up    No future appointments.    Pending Studies:        None    Time spent on discharge day patient visit: 43 minutes    #################################################    Interval history/exam for day of discharge:    Vitals:    06/03/19 2309 06/04/19 0309 06/04/19 0822 06/04/19 1201   BP: 102/70 110/75 109/61 130/69   Pulse: 75 66 60 62   Resp: 16 16 18 18   Temp: 36.5 °C (97.7 °F) 36.7 °C (98.1 °F) 37 °C (98.6 °F) 36.7 °C (98 °F)   TempSrc: Temporal Temporal Temporal Temporal   SpO2: 93% 93% 93% 94%   Weight:       Height:         Weight/BMI: Body mass index is 26.34 kg/m².  Pulse Oximetry: 94 %, O2 (LPM): 0, O2 Delivery: None (Room Air)    General:  NAD  CVS:  RRR  PULM:  CTAB, no respiratory distress    Most Recent Labs:    Lab  Results   Component Value Date/Time    WBC 10.5 06/02/2019 04:30 AM    RBC 3.90 (L) 06/02/2019 04:30 AM    HEMOGLOBIN 11.8 (L) 06/02/2019 04:30 AM    HEMATOCRIT 35.4 (L) 06/02/2019 04:30 AM    MCV 90.8 06/02/2019 04:30 AM    MCH 30.3 06/02/2019 04:30 AM    MCHC 33.3 (L) 06/02/2019 04:30 AM    MPV 11.1 06/02/2019 04:30 AM    NEUTSPOLYS 87.80 (H) 06/02/2019 04:30 AM    LYMPHOCYTES 6.10 (L) 06/02/2019 04:30 AM    MONOCYTES 4.40 06/02/2019 04:30 AM    EOSINOPHILS 0.00 06/02/2019 04:30 AM    BASOPHILS 0.00 06/02/2019 04:30 AM      Lab Results   Component Value Date/Time    SODIUM 133 (L) 06/04/2019 01:52 PM    POTASSIUM 3.9 06/04/2019 01:52 PM    CHLORIDE 100 06/04/2019 01:52 PM    CO2 28 06/04/2019 01:52 PM    GLUCOSE 286 (H) 06/04/2019 01:52 PM    BUN 20 06/04/2019 01:52 PM    CREATININE 0.87 06/04/2019 01:52 PM      No results found for: ALTSGPT, ASTSGOT, ALKPHOSPHAT, TBILIRUBIN, DBILIRUBIN, LIPASE, ALBUMIN, GLOBULIN, PREALBUMIN, INR, MACROCYTOSIS  No results found for: PROTHROMBTM, INR     Imaging/ Testing:      EC-ECHOCARDIOGRAM COMPLETE W/O CONT   Final Result      OUTSIDE IMAGES-DX CHEST   Final Result          Instructions:      The patient was instructed to return to the ER in the event of worsening symptoms. I have counseled the patient on the importance of compliance and the patient has agreed to proceed with all medical recommendations and follow up plan indicated above.   The patient understands that all medications come with benefits and risks. Risks may include permanent injury or death and these risks can be minimized with close reassessment and monitoring.

## 2019-06-13 ENCOUNTER — HOSPITAL ENCOUNTER (INPATIENT)
Facility: MEDICAL CENTER | Age: 69
LOS: 1 days | DRG: 813 | End: 2019-06-17
Attending: EMERGENCY MEDICINE | Admitting: HOSPITALIST
Payer: COMMERCIAL

## 2019-06-13 ENCOUNTER — APPOINTMENT (OUTPATIENT)
Dept: RADIOLOGY | Facility: MEDICAL CENTER | Age: 69
DRG: 813 | End: 2019-06-13
Attending: STUDENT IN AN ORGANIZED HEALTH CARE EDUCATION/TRAINING PROGRAM
Payer: COMMERCIAL

## 2019-06-13 ENCOUNTER — APPOINTMENT (OUTPATIENT)
Dept: RADIOLOGY | Facility: MEDICAL CENTER | Age: 69
DRG: 813 | End: 2019-06-13
Attending: EMERGENCY MEDICINE
Payer: COMMERCIAL

## 2019-06-13 DIAGNOSIS — D69.6 THROMBOCYTOPENIA (HCC): ICD-10-CM

## 2019-06-13 DIAGNOSIS — E11.65 TYPE 2 DIABETES MELLITUS WITH HYPERGLYCEMIA, WITHOUT LONG-TERM CURRENT USE OF INSULIN (HCC): ICD-10-CM

## 2019-06-13 LAB
ALBUMIN SERPL BCP-MCNC: 2.8 G/DL (ref 3.2–4.9)
ALBUMIN/GLOB SERPL: 0.8 G/DL
ALP SERPL-CCNC: 141 U/L (ref 30–99)
ALT SERPL-CCNC: 23 U/L (ref 2–50)
ANION GAP SERPL CALC-SCNC: 11 MMOL/L (ref 0–11.9)
APPEARANCE UR: CLEAR
AST SERPL-CCNC: 23 U/L (ref 12–45)
BASOPHILS # BLD AUTO: 0 % (ref 0–1.8)
BASOPHILS # BLD AUTO: 0.9 % (ref 0–1.8)
BASOPHILS # BLD: 0 K/UL (ref 0–0.12)
BASOPHILS # BLD: 0.06 K/UL (ref 0–0.12)
BILIRUB SERPL-MCNC: 0.8 MG/DL (ref 0.1–1.5)
BILIRUB UR QL STRIP.AUTO: NEGATIVE
BUN SERPL-MCNC: 24 MG/DL (ref 8–22)
BURR CELLS BLD QL SMEAR: NORMAL
CALCIUM SERPL-MCNC: 8.4 MG/DL (ref 8.5–10.5)
CHLORIDE SERPL-SCNC: 97 MMOL/L (ref 96–112)
CO2 SERPL-SCNC: 23 MMOL/L (ref 20–33)
COLOR UR: YELLOW
CREAT SERPL-MCNC: 1.03 MG/DL (ref 0.5–1.4)
EOSINOPHIL # BLD AUTO: 0 K/UL (ref 0–0.51)
EOSINOPHIL # BLD AUTO: 0 K/UL (ref 0–0.51)
EOSINOPHIL NFR BLD: 0 % (ref 0–6.9)
EOSINOPHIL NFR BLD: 0 % (ref 0–6.9)
ERYTHROCYTE [DISTWIDTH] IN BLOOD BY AUTOMATED COUNT: 47.9 FL (ref 35.9–50)
ERYTHROCYTE [DISTWIDTH] IN BLOOD BY AUTOMATED COUNT: 49.1 FL (ref 35.9–50)
FLUAV RNA SPEC QL NAA+PROBE: NEGATIVE
FLUBV RNA SPEC QL NAA+PROBE: NEGATIVE
GLOBULIN SER CALC-MCNC: 3.3 G/DL (ref 1.9–3.5)
GLUCOSE BLD-MCNC: 300 MG/DL (ref 65–99)
GLUCOSE SERPL-MCNC: 315 MG/DL (ref 65–99)
GLUCOSE UR STRIP.AUTO-MCNC: >=1000 MG/DL
HCT VFR BLD AUTO: 37.5 % (ref 42–52)
HCT VFR BLD AUTO: 39.1 % (ref 42–52)
HGB BLD-MCNC: 12.6 G/DL (ref 14–18)
HGB BLD-MCNC: 13 G/DL (ref 14–18)
HIV 1+2 AB+HIV1 P24 AG SERPL QL IA: NON REACTIVE
KETONES UR STRIP.AUTO-MCNC: 15 MG/DL
LEUKOCYTE ESTERASE UR QL STRIP.AUTO: NEGATIVE
LYMPHOCYTES # BLD AUTO: 0.64 K/UL (ref 1–4.8)
LYMPHOCYTES # BLD AUTO: 0.66 K/UL (ref 1–4.8)
LYMPHOCYTES NFR BLD: 9.6 % (ref 22–41)
LYMPHOCYTES NFR BLD: 9.8 % (ref 22–41)
MAGNESIUM SERPL-MCNC: 1.9 MG/DL (ref 1.5–2.5)
MANUAL DIFF BLD: NORMAL
MANUAL DIFF BLD: NORMAL
MCH RBC QN AUTO: 29.5 PG (ref 27–33)
MCH RBC QN AUTO: 29.6 PG (ref 27–33)
MCHC RBC AUTO-ENTMCNC: 33.2 G/DL (ref 33.7–35.3)
MCHC RBC AUTO-ENTMCNC: 33.6 G/DL (ref 33.7–35.3)
MCV RBC AUTO: 88.2 FL (ref 81.4–97.8)
MCV RBC AUTO: 88.9 FL (ref 81.4–97.8)
MICRO URNS: ABNORMAL
MONOCYTES # BLD AUTO: 0.79 K/UL (ref 0–0.85)
MONOCYTES # BLD AUTO: 0.99 K/UL (ref 0–0.85)
MONOCYTES NFR BLD AUTO: 11.4 % (ref 0–13.4)
MONOCYTES NFR BLD AUTO: 15.2 % (ref 0–13.4)
MORPHOLOGY BLD-IMP: NORMAL
MORPHOLOGY BLD-IMP: NORMAL
NEUTROPHILS # BLD AUTO: 4.88 K/UL (ref 1.82–7.42)
NEUTROPHILS # BLD AUTO: 5.39 K/UL (ref 1.82–7.42)
NEUTROPHILS NFR BLD: 75 % (ref 44–72)
NEUTROPHILS NFR BLD: 78.1 % (ref 44–72)
NITRITE UR QL STRIP.AUTO: NEGATIVE
NRBC # BLD AUTO: 0 K/UL
NRBC # BLD AUTO: 0 K/UL
NRBC BLD-RTO: 0 /100 WBC
NRBC BLD-RTO: 0 /100 WBC
PH UR STRIP.AUTO: 6 [PH]
PLATELET # BLD AUTO: 30 K/UL (ref 164–446)
PLATELET # BLD AUTO: 33 K/UL (ref 164–446)
PLATELET BLD QL SMEAR: NORMAL
PLATELET BLD QL SMEAR: NORMAL
PLATELETS.RETICULATED NFR BLD AUTO: 17.7 K/UL (ref 0.6–13.1)
PLATELETS.RETICULATED NFR BLD AUTO: 19.3 K/UL (ref 0.6–13.1)
POIKILOCYTOSIS BLD QL SMEAR: NORMAL
POTASSIUM SERPL-SCNC: 4 MMOL/L (ref 3.6–5.5)
PROT SERPL-MCNC: 6.1 G/DL (ref 6–8.2)
PROT UR QL STRIP: NEGATIVE MG/DL
RBC # BLD AUTO: 4.25 M/UL (ref 4.7–6.1)
RBC # BLD AUTO: 4.4 M/UL (ref 4.7–6.1)
RBC BLD AUTO: PRESENT
RBC BLD AUTO: PRESENT
RBC UR QL AUTO: NEGATIVE
SMUDGE CELLS BLD QL SMEAR: NORMAL
SMUDGE CELLS BLD QL SMEAR: NORMAL
SODIUM SERPL-SCNC: 131 MMOL/L (ref 135–145)
SP GR UR STRIP.AUTO: 1.01
UROBILINOGEN UR STRIP.AUTO-MCNC: 1 MG/DL
WBC # BLD AUTO: 6.5 K/UL (ref 4.8–10.8)
WBC # BLD AUTO: 6.9 K/UL (ref 4.8–10.8)

## 2019-06-13 PROCEDURE — 99285 EMERGENCY DEPT VISIT HI MDM: CPT

## 2019-06-13 PROCEDURE — 93970 EXTREMITY STUDY: CPT

## 2019-06-13 PROCEDURE — 82962 GLUCOSE BLOOD TEST: CPT | Mod: 91

## 2019-06-13 PROCEDURE — 87502 INFLUENZA DNA AMP PROBE: CPT

## 2019-06-13 PROCEDURE — G0378 HOSPITAL OBSERVATION PER HR: HCPCS

## 2019-06-13 PROCEDURE — 82270 OCCULT BLOOD FECES: CPT

## 2019-06-13 PROCEDURE — 80053 COMPREHEN METABOLIC PANEL: CPT

## 2019-06-13 PROCEDURE — 81003 URINALYSIS AUTO W/O SCOPE: CPT

## 2019-06-13 PROCEDURE — 700105 HCHG RX REV CODE 258: Performed by: EMERGENCY MEDICINE

## 2019-06-13 PROCEDURE — 85027 COMPLETE CBC AUTOMATED: CPT | Mod: 91

## 2019-06-13 PROCEDURE — 71046 X-RAY EXAM CHEST 2 VIEWS: CPT

## 2019-06-13 PROCEDURE — 99218 PR INITIAL OBSERVATION CARE,LEVL I: CPT | Performed by: HOSPITALIST

## 2019-06-13 PROCEDURE — 85055 RETICULATED PLATELET ASSAY: CPT | Mod: 91

## 2019-06-13 PROCEDURE — 86022 PLATELET ANTIBODIES: CPT

## 2019-06-13 PROCEDURE — 83735 ASSAY OF MAGNESIUM: CPT

## 2019-06-13 PROCEDURE — 80074 ACUTE HEPATITIS PANEL: CPT

## 2019-06-13 PROCEDURE — 700102 HCHG RX REV CODE 250 W/ 637 OVERRIDE(OP): Performed by: STUDENT IN AN ORGANIZED HEALTH CARE EDUCATION/TRAINING PROGRAM

## 2019-06-13 PROCEDURE — G0475 HIV COMBINATION ASSAY: HCPCS

## 2019-06-13 PROCEDURE — 36415 COLL VENOUS BLD VENIPUNCTURE: CPT

## 2019-06-13 PROCEDURE — 85007 BL SMEAR W/DIFF WBC COUNT: CPT | Mod: 91

## 2019-06-13 RX ORDER — SODIUM CHLORIDE 9 MG/ML
1000 INJECTION, SOLUTION INTRAVENOUS ONCE
Status: COMPLETED | OUTPATIENT
Start: 2019-06-13 | End: 2019-06-13

## 2019-06-13 RX ORDER — INSULIN GLARGINE 100 [IU]/ML
0.2 INJECTION, SOLUTION SUBCUTANEOUS EVERY EVENING
Status: DISCONTINUED | OUTPATIENT
Start: 2019-06-13 | End: 2019-06-14

## 2019-06-13 RX ORDER — INSULIN GLARGINE 100 [IU]/ML
0.2 INJECTION, SOLUTION SUBCUTANEOUS EVERY EVENING
Status: DISCONTINUED | OUTPATIENT
Start: 2019-06-13 | End: 2019-06-13

## 2019-06-13 RX ORDER — ONDANSETRON 2 MG/ML
4 INJECTION INTRAMUSCULAR; INTRAVENOUS EVERY 4 HOURS PRN
Status: DISCONTINUED | OUTPATIENT
Start: 2019-06-13 | End: 2019-06-17 | Stop reason: HOSPADM

## 2019-06-13 RX ORDER — ONDANSETRON 4 MG/1
4 TABLET, ORALLY DISINTEGRATING ORAL EVERY 4 HOURS PRN
Status: DISCONTINUED | OUTPATIENT
Start: 2019-06-13 | End: 2019-06-17 | Stop reason: HOSPADM

## 2019-06-13 RX ORDER — BISACODYL 10 MG
10 SUPPOSITORY, RECTAL RECTAL
Status: DISCONTINUED | OUTPATIENT
Start: 2019-06-13 | End: 2019-06-17 | Stop reason: HOSPADM

## 2019-06-13 RX ORDER — INSULIN GLARGINE 100 [IU]/ML
10 INJECTION, SOLUTION SUBCUTANEOUS NIGHTLY
Status: ON HOLD | COMMUNITY
End: 2019-06-17

## 2019-06-13 RX ORDER — AMOXICILLIN 250 MG
2 CAPSULE ORAL 2 TIMES DAILY
Status: DISCONTINUED | OUTPATIENT
Start: 2019-06-14 | End: 2019-06-17 | Stop reason: HOSPADM

## 2019-06-13 RX ORDER — POLYETHYLENE GLYCOL 3350 17 G/17G
1 POWDER, FOR SOLUTION ORAL
Status: DISCONTINUED | OUTPATIENT
Start: 2019-06-13 | End: 2019-06-17 | Stop reason: HOSPADM

## 2019-06-13 RX ORDER — VITAMIN C
1 TAB ORAL DAILY
Status: DISCONTINUED | OUTPATIENT
Start: 2019-06-14 | End: 2019-06-17 | Stop reason: HOSPADM

## 2019-06-13 RX ORDER — ENALAPRILAT 1.25 MG/ML
1.25 INJECTION INTRAVENOUS EVERY 6 HOURS PRN
Status: DISCONTINUED | OUTPATIENT
Start: 2019-06-13 | End: 2019-06-17 | Stop reason: HOSPADM

## 2019-06-13 RX ORDER — ATORVASTATIN CALCIUM 20 MG/1
20 TABLET, FILM COATED ORAL DAILY
Status: DISCONTINUED | OUTPATIENT
Start: 2019-06-14 | End: 2019-06-13

## 2019-06-13 RX ORDER — ACETAMINOPHEN 325 MG/1
650 TABLET ORAL EVERY 6 HOURS PRN
Status: DISCONTINUED | OUTPATIENT
Start: 2019-06-13 | End: 2019-06-17 | Stop reason: HOSPADM

## 2019-06-13 RX ADMIN — SODIUM CHLORIDE 1000 ML: 9 INJECTION, SOLUTION INTRAVENOUS at 20:45

## 2019-06-13 ASSESSMENT — ENCOUNTER SYMPTOMS
HEMOPTYSIS: 0
COUGH: 1

## 2019-06-13 ASSESSMENT — PATIENT HEALTH QUESTIONNAIRE - PHQ9
2. FEELING DOWN, DEPRESSED, IRRITABLE, OR HOPELESS: NOT AT ALL
1. LITTLE INTEREST OR PLEASURE IN DOING THINGS: NOT AT ALL
SUM OF ALL RESPONSES TO PHQ9 QUESTIONS 1 AND 2: 0

## 2019-06-14 PROBLEM — E86.1 HYPOTENSION DUE TO HYPOVOLEMIA: Status: ACTIVE | Noted: 2019-06-14

## 2019-06-14 PROBLEM — I95.89 HYPOTENSION DUE TO HYPOVOLEMIA: Status: ACTIVE | Noted: 2019-06-14

## 2019-06-14 LAB
ALBUMIN SERPL BCP-MCNC: 2.1 G/DL (ref 3.2–4.9)
ALBUMIN/GLOB SERPL: 0.8 G/DL
ALP SERPL-CCNC: 99 U/L (ref 30–99)
ALT SERPL-CCNC: 16 U/L (ref 2–50)
ANION GAP SERPL CALC-SCNC: 7 MMOL/L (ref 0–11.9)
ANISOCYTOSIS BLD QL SMEAR: ABNORMAL
APTT PPP: 30.4 SEC (ref 24.7–36)
AST SERPL-CCNC: 15 U/L (ref 12–45)
B-OH-BUTYR SERPL-MCNC: 0.18 MMOL/L (ref 0.02–0.27)
BASOPHILS # BLD AUTO: 0 % (ref 0–1.8)
BASOPHILS # BLD: 0 K/UL (ref 0–0.12)
BILIRUB SERPL-MCNC: 0.5 MG/DL (ref 0.1–1.5)
BUN SERPL-MCNC: 20 MG/DL (ref 8–22)
CALCIUM SERPL-MCNC: 7.6 MG/DL (ref 8.5–10.5)
CHLORIDE SERPL-SCNC: 107 MMOL/L (ref 96–112)
CO2 SERPL-SCNC: 24 MMOL/L (ref 20–33)
CREAT SERPL-MCNC: 0.81 MG/DL (ref 0.5–1.4)
D DIMER PPP IA.FEU-MCNC: 1.88 UG/ML (FEU) (ref 0–0.5)
EKG IMPRESSION: NORMAL
EOSINOPHIL # BLD AUTO: 0 K/UL (ref 0–0.51)
EOSINOPHIL NFR BLD: 0 % (ref 0–6.9)
ERYTHROCYTE [DISTWIDTH] IN BLOOD BY AUTOMATED COUNT: 48 FL (ref 35.9–50)
ERYTHROCYTE [SEDIMENTATION RATE] IN BLOOD BY WESTERGREN METHOD: 21 MM/HOUR (ref 0–20)
FOLATE SERPL-MCNC: 20.7 NG/ML
GLOBULIN SER CALC-MCNC: 2.5 G/DL (ref 1.9–3.5)
GLUCOSE BLD-MCNC: 108 MG/DL (ref 65–99)
GLUCOSE BLD-MCNC: 232 MG/DL (ref 65–99)
GLUCOSE BLD-MCNC: 286 MG/DL (ref 65–99)
GLUCOSE SERPL-MCNC: 125 MG/DL (ref 65–99)
HAV IGM SERPL QL IA: NEGATIVE
HBV CORE IGM SER QL: NEGATIVE
HBV SURFACE AG SER QL: NEGATIVE
HCT VFR BLD AUTO: 29.9 % (ref 42–52)
HCV AB SER QL: NEGATIVE
HEPIND PLTAB  51052: NEGATIVE
HETEROPH AB SER QL: NEGATIVE
HGB BLD-MCNC: 10.2 G/DL (ref 14–18)
HYPOCHROMIA BLD QL SMEAR: ABNORMAL
INR PPP: 1.03 (ref 0.87–1.13)
LDH SERPL L TO P-CCNC: 129 U/L (ref 107–266)
LYMPHOCYTES # BLD AUTO: 1.32 K/UL (ref 1–4.8)
LYMPHOCYTES NFR BLD: 26.3 % (ref 22–41)
MANUAL DIFF BLD: NORMAL
MCH RBC QN AUTO: 29.4 PG (ref 27–33)
MCHC RBC AUTO-ENTMCNC: 33.3 G/DL (ref 33.7–35.3)
MCV RBC AUTO: 88.2 FL (ref 81.4–97.8)
MONOCYTES # BLD AUTO: 0.35 K/UL (ref 0–0.85)
MONOCYTES NFR BLD AUTO: 7 % (ref 0–13.4)
MORPHOLOGY BLD-IMP: NORMAL
NEUTROPHILS # BLD AUTO: 3.34 K/UL (ref 1.82–7.42)
NEUTROPHILS NFR BLD: 66.7 % (ref 44–72)
NRBC # BLD AUTO: 0 K/UL
NRBC BLD-RTO: 0 /100 WBC
PLATELET # BLD AUTO: 38 K/UL (ref 164–446)
PLATELET BLD QL SMEAR: NORMAL
PLATELETS.RETICULATED NFR BLD AUTO: 13.8 K/UL (ref 0.6–13.1)
PMV BLD AUTO: 11.9 FL (ref 9–12.9)
POTASSIUM SERPL-SCNC: 3.5 MMOL/L (ref 3.6–5.5)
PROT SERPL-MCNC: 4.6 G/DL (ref 6–8.2)
PROTHROMBIN TIME: 13.7 SEC (ref 12–14.6)
RBC # BLD AUTO: 3.4 M/UL (ref 4.7–6.1)
RBC BLD AUTO: PRESENT
SODIUM SERPL-SCNC: 138 MMOL/L (ref 135–145)
TSH SERPL DL<=0.005 MIU/L-ACNC: 1.32 UIU/ML (ref 0.38–5.33)
VIT B12 SERPL-MCNC: 1312 PG/ML (ref 211–911)
WBC # BLD AUTO: 5 K/UL (ref 4.8–10.8)

## 2019-06-14 PROCEDURE — 85379 FIBRIN DEGRADATION QUANT: CPT

## 2019-06-14 PROCEDURE — 85730 THROMBOPLASTIN TIME PARTIAL: CPT

## 2019-06-14 PROCEDURE — A9270 NON-COVERED ITEM OR SERVICE: HCPCS | Performed by: STUDENT IN AN ORGANIZED HEALTH CARE EDUCATION/TRAINING PROGRAM

## 2019-06-14 PROCEDURE — 85652 RBC SED RATE AUTOMATED: CPT

## 2019-06-14 PROCEDURE — G0378 HOSPITAL OBSERVATION PER HR: HCPCS

## 2019-06-14 PROCEDURE — 82607 VITAMIN B-12: CPT

## 2019-06-14 PROCEDURE — 86308 HETEROPHILE ANTIBODY SCREEN: CPT

## 2019-06-14 PROCEDURE — 85007 BL SMEAR W/DIFF WBC COUNT: CPT

## 2019-06-14 PROCEDURE — 700102 HCHG RX REV CODE 250 W/ 637 OVERRIDE(OP): Performed by: STUDENT IN AN ORGANIZED HEALTH CARE EDUCATION/TRAINING PROGRAM

## 2019-06-14 PROCEDURE — 83010 ASSAY OF HAPTOGLOBIN QUANT: CPT

## 2019-06-14 PROCEDURE — 86644 CMV ANTIBODY: CPT

## 2019-06-14 PROCEDURE — 96372 THER/PROPH/DIAG INJ SC/IM: CPT

## 2019-06-14 PROCEDURE — 700105 HCHG RX REV CODE 258: Performed by: STUDENT IN AN ORGANIZED HEALTH CARE EDUCATION/TRAINING PROGRAM

## 2019-06-14 PROCEDURE — 87799 DETECT AGENT NOS DNA QUANT: CPT

## 2019-06-14 PROCEDURE — 85027 COMPLETE CBC AUTOMATED: CPT

## 2019-06-14 PROCEDURE — 82962 GLUCOSE BLOOD TEST: CPT | Mod: 91

## 2019-06-14 PROCEDURE — 93005 ELECTROCARDIOGRAM TRACING: CPT | Performed by: STUDENT IN AN ORGANIZED HEALTH CARE EDUCATION/TRAINING PROGRAM

## 2019-06-14 PROCEDURE — 82746 ASSAY OF FOLIC ACID SERUM: CPT

## 2019-06-14 PROCEDURE — 85610 PROTHROMBIN TIME: CPT

## 2019-06-14 PROCEDURE — 86645 CMV ANTIBODY IGM: CPT

## 2019-06-14 PROCEDURE — 87798 DETECT AGENT NOS DNA AMP: CPT

## 2019-06-14 PROCEDURE — 83615 LACTATE (LD) (LDH) ENZYME: CPT

## 2019-06-14 PROCEDURE — 36415 COLL VENOUS BLD VENIPUNCTURE: CPT

## 2019-06-14 PROCEDURE — 80053 COMPREHEN METABOLIC PANEL: CPT

## 2019-06-14 PROCEDURE — 93010 ELECTROCARDIOGRAM REPORT: CPT | Performed by: INTERNAL MEDICINE

## 2019-06-14 PROCEDURE — 85055 RETICULATED PLATELET ASSAY: CPT

## 2019-06-14 PROCEDURE — 84443 ASSAY THYROID STIM HORMONE: CPT

## 2019-06-14 PROCEDURE — 86022 PLATELET ANTIBODIES: CPT

## 2019-06-14 PROCEDURE — 82010 KETONE BODYS QUAN: CPT

## 2019-06-14 RX ORDER — POTASSIUM CHLORIDE 20 MEQ/1
40 TABLET, EXTENDED RELEASE ORAL ONCE
Status: COMPLETED | OUTPATIENT
Start: 2019-06-14 | End: 2019-06-14

## 2019-06-14 RX ORDER — SODIUM CHLORIDE 9 MG/ML
INJECTION, SOLUTION INTRAVENOUS CONTINUOUS
Status: DISCONTINUED | OUTPATIENT
Start: 2019-06-14 | End: 2019-06-16

## 2019-06-14 RX ORDER — INSULIN GLARGINE 100 [IU]/ML
13 INJECTION, SOLUTION SUBCUTANEOUS EVERY EVENING
Status: DISCONTINUED | OUTPATIENT
Start: 2019-06-14 | End: 2019-06-15

## 2019-06-14 RX ORDER — SODIUM CHLORIDE 9 MG/ML
1000 INJECTION, SOLUTION INTRAVENOUS ONCE
Status: COMPLETED | OUTPATIENT
Start: 2019-06-14 | End: 2019-06-14

## 2019-06-14 RX ADMIN — VITAMIN C 1 TABLET: TAB at 07:21

## 2019-06-14 RX ADMIN — SODIUM CHLORIDE: 9 INJECTION, SOLUTION INTRAVENOUS at 09:32

## 2019-06-14 RX ADMIN — SODIUM CHLORIDE: 9 INJECTION, SOLUTION INTRAVENOUS at 23:49

## 2019-06-14 RX ADMIN — INSULIN GLARGINE 13 UNITS: 100 INJECTION, SOLUTION SUBCUTANEOUS at 18:58

## 2019-06-14 RX ADMIN — INSULIN GLARGINE 16 UNITS: 100 INJECTION, SOLUTION SUBCUTANEOUS at 01:05

## 2019-06-14 RX ADMIN — POTASSIUM CHLORIDE 40 MEQ: 1500 TABLET, EXTENDED RELEASE ORAL at 07:20

## 2019-06-14 RX ADMIN — SODIUM CHLORIDE 1000 ML: 9 INJECTION, SOLUTION INTRAVENOUS at 01:40

## 2019-06-14 RX ADMIN — INSULIN LISPRO 5 UNITS: 100 INJECTION, SOLUTION INTRAVENOUS; SUBCUTANEOUS at 00:31

## 2019-06-14 RX ADMIN — INSULIN LISPRO 3 UNITS: 100 INJECTION, SOLUTION INTRAVENOUS; SUBCUTANEOUS at 09:37

## 2019-06-14 ASSESSMENT — ENCOUNTER SYMPTOMS
NAUSEA: 0
HEARTBURN: 0
COUGH: 0
ABDOMINAL PAIN: 0
CHILLS: 1
MYALGIAS: 0
DEPRESSION: 0
PALPITATIONS: 0
FEVER: 0
TINGLING: 0
VOMITING: 1
BLOOD IN STOOL: 0
DIZZINESS: 0
BRUISES/BLEEDS EASILY: 0
BACK PAIN: 0
CONSTIPATION: 0
DIAPHORESIS: 1
FEVER: 1
BLURRED VISION: 0
NERVOUS/ANXIOUS: 0
SPEECH CHANGE: 0
SHORTNESS OF BREATH: 0
HEMOPTYSIS: 0
DOUBLE VISION: 0
HEADACHES: 0
VOMITING: 0
DIARRHEA: 0
NECK PAIN: 0
WEAKNESS: 0
DIARRHEA: 1

## 2019-06-14 NOTE — ED NOTES
Pt pancho [place], sent to ER by MD for evaluation of hyperglycemia.  Blood sugar = 300 in triage.  Pt is a known diabetic.  A&ox4.  Pt to lobby & advised to inform RN of any changes.

## 2019-06-14 NOTE — PROGRESS NOTES
Patient brought up from ED on tele with RN.  Assessment and admit profile complete.  Blood drawn and sent to lab.  No pain at this time. Tele: NSR, Oxygen: RA. Call light within reach. All questions answered at this time. Skin intact.

## 2019-06-14 NOTE — PROGRESS NOTES
Pt in bed awake,sleepy,answering questions,no c/o pain,looks weak,no c/o dizziness,poc explained,tele with SR.

## 2019-06-14 NOTE — SENIOR ADMIT NOTE
"SENIOR ADMIT NOTE    Mr. Pa Waters is a 69M with PMHx of T2DM (recent A1C 10.6%) recently hospitalized less than 2 weeks ago for mild DKA, discharged with glargine, metformin, and statin. He presents with 3-4 days of fever and chills, and experienced 3 days of nausea/vomiting (ceased 4 days ago) with some dry heaving since; but has no other focalizing symptoms including abdominal pain, diarrhea, BRBPR, cough, chest pain, SOB, dysuria. He was found in ER incidentally to have plt count of 30, down from 149 on 6/2/19. He denies unilateral leg swelling, unusual bruising/bleeding, any rash except for a few new small papules he has noted on his chest, or dark urine.     ED workup:  CBC remarkable for: Hgb mildly low at 13, plt very low at 30 (previously, 149 on 6/2). Immature platelet fraction elevated at 17.7 K/uL (normal range .6-13.1). Smear with \"few\" smudge cells; no schistocytes noted. CMP: mild hypoNa at 131, BG moderately elevated at 315, BUN/Cr at 24/1.03, ALP mildly elevated (141) with normal AST/ALT.     P/E:   Gen: pleasant, well-appearing gentleman in NAD. CV: RRR w/o M/R/G. Pulm: CTAB. Abd: soft, NT/ND, no rebound/guarding. Ext: no peripheral edema. No petechiae noted. Skin: few small macules/papules noted on chest, no significant erythema. No petechial rashes or purpura noted to exposed skin. Neuro: A&O x4.     Plan:   # TCP   -new and occurred over relatively short period of time, with drop from 149 to 30 over a course of ~2 weeks.   -Ddx includes delayed-onset HIT (initially considered likely given timing of presentation, that he received heparin DVT ppx at last admission, and intermediate 4Ts score of 4; however, heparin induced plt Ab testing was negative); TTP (felt unlikely given lack of easy bruising/bleeding, petechiae, purpura, neurological deficits, and KORI--though Cr slightly up from 2 weeks ago from .7-->.1); or ITP (most likely diagnosis at this point, unclear whether primary or secondary). " Given clinical presentation/exam do not feel DIC is likely.   -heparin-induced plt Ab negative as above; we will send SRS anyways given intermediate-probability 4TS score   -working up secondary causes of ITP: ordering haptoglobin, B12, folate, TSH/T4, HIV, hepatitis panel. Day team to consider adding ESR, HARSHIL if autoimmune cause felt likely.   # T2DM  # Ketonuria  # Hyperglycemia   -given BGs relatively uncontrolled on beginning dose of glargine he has been using for the last two weeks, increased glargine from 10U to 16U QHS; added scheduled 5U lispro tid w/m; hypoglycemic protocol. DM diet. Ordered beta-hydroxy butyrate to assess for ketonemia; however, given normal bicarb and AG do not suspect DKA at this time.

## 2019-06-14 NOTE — ED NOTES
Med Rec Updated and Complete per Pt at bedside  Allergies Reviewed  No PO ABX last 14 days.    Pt reports he was given Atorvastatin on 06/04/19 but did not start taking it because he read about it online.

## 2019-06-14 NOTE — H&P
Internal Medicine Admitting History and Physical    Note Author: Mabel Ronquillo M.D.     Name Aryan Dominguez     1950   Age/Sex 69 y.o. male   MRN [id number]   Code Status DNR/ok to intubate      After 5PM or if no immediate response to page, please call for cross-coverage  Attending/Team: Dr. Burgess See Patient List for primary contact information  Call [phone] to page    1st Call - Day Intern (R1):   Dr. Catalan 2nd Call - Day Sr. Resident (R2/R3):   Dr. Valle     Chief Complaint:   Incidental thrombocytopenia     HPI:  Mr. Aryan Waters is a 69 year old Type 2 diabetic male who was sent from his primary care physician for decreased platelet count.  Patient was recently admitted in the hospital for 2 days 9 days prior to this encounter for DKA secondary to nonadherence to his medication.  At that time, he did receive 5 doses of heparin for DVT prophylaxis.  At that time he was also recently treated for a supposed viral infection with a few days of steroids.  Patient is also has states he has had about 3 nights of night sweats, however otherwise he has been feeling good.  His home sugars have been running from 150s to 250s on 10 units of Lantus which he was discharged with from his recent hospital stay.  He is also taking metformin 1 g twice a day.  He denies any recent travel, liver disease,  Was given a statin at discharge, however patient has not yet taken that medication.  New medications in the last 2 weeks include the Lantus.  Denies any chest pain, difficulty of breathing, leg pain, leg swelling, abdominal pain, bleeding from any sites.    In the emergency room, patient was afebrile, HR: 70, RR: 16, BP: 94/57. CBC: WBC: 6.9 h/h: 12.6/37.5, plt: 33, Na: 131, 4.0, HCO3: 23, crea: 1.03, glucose: 315. He was given 2x1L fluid bolus     Review of Systems   Constitutional: Positive for chills and fever. Negative for malaise/fatigue.   HENT: Negative for hearing loss, nosebleeds and  tinnitus.    Eyes: Negative for blurred vision and double vision.   Respiratory: Negative for cough, hemoptysis and shortness of breath.    Cardiovascular: Negative for chest pain and palpitations.   Gastrointestinal: Negative for abdominal pain, blood in stool, constipation, diarrhea, heartburn, melena, nausea and vomiting.   Genitourinary: Negative for dysuria, hematuria and urgency.   Musculoskeletal: Negative for joint pain, myalgias and neck pain.   Skin: Negative for itching and rash.   Neurological: Negative for dizziness, tingling, speech change, weakness and headaches.   Endo/Heme/Allergies: Negative for environmental allergies. Does not bruise/bleed easily.   Psychiatric/Behavioral: Negative for depression. The patient is not nervous/anxious.      Past Medical History (Chronic medical problem, known complications and current treatment)    Past Medical History:   Diagnosis Date   • Diabetes (HCC)      Past Surgical History:  No past surgical history on file.    Current Outpatient Medications:  Home Medications     Reviewed by Ursula Timmons (Pharmacy Tech) on 06/13/19 at 2123  Med List Status: Complete   Medication Last Dose Status   atorvastatin (LIPITOR) 20 MG Tab Not Started Active   B Complex Vitamins (VITAMIN B COMPLEX) Tab 6/9/2019 Active   Ginseng 50 MG Cap 6/13/2019 Active   insulin glargine (LANTUS) 100 UNIT/ML Solution 6/12/2019 Active   metformin (GLUCOPHAGE) 1000 MG tablet 6/13/2019 Active   Pumpkin Seed 500 MG Tab 6/9/2019 Active              Medication Allergy/Sensitivities:  No Known Allergies    Family History (mandatory)   No family history on file.    Social History (mandatory)   Social History     Social History   • Marital status:      Spouse name: N/A   • Number of children: N/A   • Years of education: N/A     Occupational History   • Not on file.     Social History Main Topics   • Smoking status: Former Smoker     Types: Cigarettes     Quit date: 6/2/1989   • Smokeless  "tobacco: Never Used   • Alcohol use No   • Drug use: No   • Sexual activity: Not on file     Other Topics Concern   • Not on file     Social History Narrative   • No narrative on file     Living situation: Lives at home with wife   PCP : Pcp Pt States None    Physical Exam     Vitals:    06/13/19 1719 06/13/19 2201 06/13/19 2230 06/13/19 2327   BP:    (!) 94/57   Pulse:    66   Resp:  16 14 17   Temp:    36.5 °C (97.7 °F)   TempSrc:    Temporal   SpO2:  95% 95% 96%   Weight: 77.7 kg (171 lb 4.8 oz)   77.6 kg (171 lb 1.2 oz)   Height:    1.753 m (5' 9\")     Body mass index is 25.26 kg/m².  BP (!) 94/57   Pulse 66   Temp 36.5 °C (97.7 °F) (Temporal)   Resp 17   Ht 1.753 m (5' 9\")   Wt 77.6 kg (171 lb 1.2 oz)   SpO2 96%   BMI 25.26 kg/m²   O2 therapy: Pulse Oximetry: 96 %    Physical Exam   Constitutional: He is oriented to person, place, and time and well-developed, well-nourished, and in no distress. No distress.   HENT:   Head: Normocephalic and atraumatic.   Mouth/Throat: Oropharynx is clear and moist. No oropharyngeal exudate.   No gum bleeding, petechiae in oral mucosa    Eyes: Pupils are equal, round, and reactive to light. Conjunctivae and EOM are normal. No scleral icterus.   Neck: Normal range of motion. No JVD present.   Cardiovascular: Normal rate, regular rhythm, normal heart sounds and intact distal pulses.  Exam reveals no gallop and no friction rub.    No murmur heard.  Pulmonary/Chest: Effort normal and breath sounds normal. No respiratory distress. He has no wheezes. He has no rales.   Abdominal: Soft. Bowel sounds are normal. He exhibits no distension. There is no tenderness. There is no rebound and no guarding.   Genitourinary:   Genitourinary Comments: Declined rectal exam    Musculoskeletal: Normal range of motion. He exhibits no edema or deformity.   Lymphadenopathy:     He has no cervical adenopathy.   Neurological: He is alert and oriented to person, place, and time. He has normal " sensation and normal strength. No cranial nerve deficit. Gait normal.   Skin: Skin is warm and dry. No rash noted. He is not diaphoretic. No erythema.   No petechiae, purpura, ecchymoses   Psychiatric: Affect and judgment normal.   Vitals reviewed.    Data Review       Old Records Request:   Deferred  Current Records review/summary: Completed    Lab Data Review:  Recent Results (from the past 24 hour(s))   ACCU-CHEK GLUCOSE    Collection Time: 06/13/19  5:22 PM   Result Value Ref Range    Glucose - Accu-Ck 300 (H) 65 - 99 mg/dL   CBC WITH DIFFERENTIAL    Collection Time: 06/13/19  5:33 PM   Result Value Ref Range    WBC 6.5 4.8 - 10.8 K/uL    RBC 4.40 (L) 4.70 - 6.10 M/uL    Hemoglobin 13.0 (L) 14.0 - 18.0 g/dL    Hematocrit 39.1 (L) 42.0 - 52.0 %    MCV 88.9 81.4 - 97.8 fL    MCH 29.5 27.0 - 33.0 pg    MCHC 33.2 (L) 33.7 - 35.3 g/dL    RDW 49.1 35.9 - 50.0 fL    Platelet Count 30 (LL) 164 - 446 K/uL    Neutrophils-Polys 75.00 (H) 44.00 - 72.00 %    Lymphocytes 9.80 (L) 22.00 - 41.00 %    Monocytes 15.20 (H) 0.00 - 13.40 %    Eosinophils 0.00 0.00 - 6.90 %    Basophils 0.00 0.00 - 1.80 %    Nucleated RBC 0.00 /100 WBC    Neutrophils (Absolute) 4.88 1.82 - 7.42 K/uL    Lymphs (Absolute) 0.64 (L) 1.00 - 4.80 K/uL    Monos (Absolute) 0.99 (H) 0.00 - 0.85 K/uL    Eos (Absolute) 0.00 0.00 - 0.51 K/uL    Baso (Absolute) 0.00 0.00 - 0.12 K/uL    NRBC (Absolute) 0.00 K/uL   COMP METABOLIC PANEL    Collection Time: 06/13/19  5:33 PM   Result Value Ref Range    Sodium 131 (L) 135 - 145 mmol/L    Potassium 4.0 3.6 - 5.5 mmol/L    Chloride 97 96 - 112 mmol/L    Co2 23 20 - 33 mmol/L    Anion Gap 11.0 0.0 - 11.9    Glucose 315 (H) 65 - 99 mg/dL    Bun 24 (H) 8 - 22 mg/dL    Creatinine 1.03 0.50 - 1.40 mg/dL    Calcium 8.4 (L) 8.5 - 10.5 mg/dL    AST(SGOT) 23 12 - 45 U/L    ALT(SGPT) 23 2 - 50 U/L    Alkaline Phosphatase 141 (H) 30 - 99 U/L    Total Bilirubin 0.8 0.1 - 1.5 mg/dL    Albumin 2.8 (L) 3.2 - 4.9 g/dL    Total Protein  6.1 6.0 - 8.2 g/dL    Globulin 3.3 1.9 - 3.5 g/dL    A-G Ratio 0.8 g/dL   ESTIMATED GFR    Collection Time: 06/13/19  5:33 PM   Result Value Ref Range    GFR If African American >60 >60 mL/min/1.73 m 2    GFR If Non African American >60 >60 mL/min/1.73 m 2   PLATELET ESTIMATE    Collection Time: 06/13/19  5:33 PM   Result Value Ref Range    Plt Estimation Marked Decrease    MORPHOLOGY    Collection Time: 06/13/19  5:33 PM   Result Value Ref Range    RBC Morphology Present     Smudge Cells Few    PERIPHERAL SMEAR REVIEW    Collection Time: 06/13/19  5:33 PM   Result Value Ref Range    Peripheral Smear Review see below    IMMATURE PLT FRACTION    Collection Time: 06/13/19  5:33 PM   Result Value Ref Range    Imm. Plt Fraction 17.7 (H) 0.6 - 13.1 K/uL   DIFFERENTIAL MANUAL    Collection Time: 06/13/19  5:33 PM   Result Value Ref Range    Manual Diff Status PERFORMED    CBC WITH DIFFERENTIAL    Collection Time: 06/13/19  7:02 PM   Result Value Ref Range    WBC 6.9 4.8 - 10.8 K/uL    RBC 4.25 (L) 4.70 - 6.10 M/uL    Hemoglobin 12.6 (L) 14.0 - 18.0 g/dL    Hematocrit 37.5 (L) 42.0 - 52.0 %    MCV 88.2 81.4 - 97.8 fL    MCH 29.6 27.0 - 33.0 pg    MCHC 33.6 (L) 33.7 - 35.3 g/dL    RDW 47.9 35.9 - 50.0 fL    Platelet Count 33 (LL) 164 - 446 K/uL    Neutrophils-Polys 78.10 (H) 44.00 - 72.00 %    Lymphocytes 9.60 (L) 22.00 - 41.00 %    Monocytes 11.40 0.00 - 13.40 %    Eosinophils 0.00 0.00 - 6.90 %    Basophils 0.90 0.00 - 1.80 %    Nucleated RBC 0.00 /100 WBC    Neutrophils (Absolute) 5.39 1.82 - 7.42 K/uL    Lymphs (Absolute) 0.66 (L) 1.00 - 4.80 K/uL    Monos (Absolute) 0.79 0.00 - 0.85 K/uL    Eos (Absolute) 0.00 0.00 - 0.51 K/uL    Baso (Absolute) 0.06 0.00 - 0.12 K/uL    NRBC (Absolute) 0.00 K/uL   DIFFERENTIAL MANUAL    Collection Time: 06/13/19  7:02 PM   Result Value Ref Range    Manual Diff Status PERFORMED    PERIPHERAL SMEAR REVIEW    Collection Time: 06/13/19  7:02 PM   Result Value Ref Range    Peripheral Smear  Review see below    PLATELET ESTIMATE    Collection Time: 06/13/19  7:02 PM   Result Value Ref Range    Plt Estimation Marked Decrease    MORPHOLOGY    Collection Time: 06/13/19  7:02 PM   Result Value Ref Range    RBC Morphology Present     Poikilocytosis 1+     Echinocytes 1+     Smudge Cells Few    IMMATURE PLT FRACTION    Collection Time: 06/13/19  7:02 PM   Result Value Ref Range    Imm. Plt Fraction 19.3 (H) 0.6 - 13.1 K/uL   Magnesium    Collection Time: 06/13/19  7:02 PM   Result Value Ref Range    Magnesium 1.9 1.5 - 2.5 mg/dL   HIV AG/AB COMBO ASSAY SCREENING    Collection Time: 06/13/19  7:02 PM   Result Value Ref Range    HIV Ag/Ab Combo Assay Non Reactive Non Reactive   Influenza A/B By PCR (Adult - Flu Only)    Collection Time: 06/13/19  7:56 PM   Result Value Ref Range    Influenza virus A RNA Negative Negative    Influenza virus B, PCR Negative Negative   URINALYSIS (UA)    Collection Time: 06/13/19  7:56 PM   Result Value Ref Range    Color Yellow     Character Clear     Specific Gravity 1.013 <1.035    Ph 6.0 5.0 - 8.0    Glucose >=1000 (A) Negative mg/dL    Ketones 15 (A) Negative mg/dL    Protein Negative Negative mg/dL    Bilirubin Negative Negative    Urobilinogen, Urine 1.0 Negative    Nitrite Negative Negative    Leukocyte Esterase Negative Negative    Occult Blood Negative Negative    Micro Urine Req see below    ACCU-CHEK GLUCOSE    Collection Time: 06/13/19 11:45 PM   Result Value Ref Range    Glucose - Accu-Ck 232 (H) 65 - 99 mg/dL   HEPARIN INDUCED PLATELET AB(HIT)    Collection Time: 06/13/19 11:47 PM   Result Value Ref Range    Hep Ind Plt Ab Negative Negative     Imaging/Procedures Review:    Independant Imaging Review: Completed     US-EXTREMITY VENOUS LOWER BILAT   Final Result      DX-CHEST-2 VIEWS   Final Result      No acute cardiopulmonary abnormality.        EKG:   Pending     Records reviewed and summarized in current documentation :  Yes     Assessment/Plan     *  Thrombocytopenia (HCC)   Assessment & Plan    5 doses of UFH 5000u, last dose 9 days ago  4T's score: 6 points (high probability)   No new meds, no history of liver disease,    Recent viral illness     -Await HIT Ab testing, HIV, Hep screen   -Hold DVT prophylaxis for now, SCDs, patient encouraged to ambulate   -Venous US for clot screening   -Watch for signs of bleeding   -Monitor CBC      Type 2 diabetes mellitus with hyperglycemia, without long-term current use of insulin (HCC)- (present on admission)   Assessment & Plan    A1C: 10.6   Uncontrolled with polyneuropathy   -DM diet   -Continue lantus, increase to 16u nightly as FBS have ranged from 160-250s at home   -Will do meal time insulin while admitted, however patient would benefit from a simplified regimen hopefully up-titrating lantus appropriately will be sufficient until outpatient follow up   -Hold metformin for now, restart once appropriate   -Hypoglycemia protocol        Anticipated Hospital stay: Observation admit    Quality Measures  Quality-Core Measures   Reviewed items::  Labs reviewed, Medications reviewed and EKG reviewed  Aiken catheter::  No Aiken  DVT prophylaxis pharmacological::  Contraindicated - High bleeding risk  DVT prophylaxis - mechanical:  SCDs    PCP: Pcp Pt States None

## 2019-06-14 NOTE — ASSESSMENT & PLAN NOTE
"5 doses of UFH 5000u, last dose 9 days ago  4T's score: 6 points (high probability).  However, this is been ruled out by heparin antibody test  Potential recent viral illness given the symptoms of arthralgia(bilateral knee and elbows), episode of vomiting/nausea 4 days ago, severe diaphoresis nightly with 10 pound weight loss in the last week  No s/s of bleeding    HIT Ab test negative, HIV, Hep screen negaive  -SCDs, patient encouraged to ambulate   -Thrombocytopenia improving.  Platelets increased from 30s to 50s to 70's  - Lantus 15 units  - Abdominal ultrasound did not show hepatosplenomegaly, however it did show \"Hypoechoic nodule adjacent to the inferior right kidney could represent an exophytic cyst but a solid mass is not excluded. Further evaluation with CT is recommended.\"  -CT abdomen ordered      "

## 2019-06-14 NOTE — PROGRESS NOTES
"       Internal Medicine Interval Note  Note Author: August Wood M.D.     Name Aryan Dominguez     1950   Age/Sex 69 y.o. male   MRN [id number]   Code Status DNR     After 5PM or if no immediate response to page, please call for cross-coverage  Attending/Team: Dr. Ray/Nikki See Patient List for primary contact information  Call [phone] to page    1st Call - Day Intern (R1):   Dr. Wood 2nd Call - Day Sr. Resident (R2/R3):   Dr. Castro         Reason for interval visit  (Principal Problem)   Incidental Thrombocytopenia      Interval Problem Daily Status Update  (24 hours, problem oriented, brief subjective history, new lab/imaging data pertinent to that problem)   Patient was seen resting comfortably in bed.  Started having bilateral knee, elbow, severe diaphoresis, \" shaking\", lethargy that started 4 days ago.  He was given low-dose steroids in the outpatient setting for joint pain about 10 days ago.  4 days after that patient presented to the ED for mild DKA with blood sugars in 500s.  He was discharged with Lantus from that hospitalization.  Patient presented overnight for incidental finding of thrombocytopenia    - He reports improvement in symptoms  - Blood sugar 108 this morning.  Reduced Lantus from 16 units to 13.      Review of Systems   Constitutional: Positive for chills and diaphoresis (Marked every night for the last 4 nights). Negative for fever.   HENT: Negative for hearing loss and tinnitus.    Eyes: Negative for blurred vision and double vision.   Respiratory: Negative for cough and hemoptysis.    Cardiovascular: Negative for chest pain and palpitations.   Gastrointestinal: Positive for diarrhea and vomiting. Negative for abdominal pain, blood in stool, heartburn and melena.   Genitourinary: Negative for dysuria and urgency.   Musculoskeletal: Positive for joint pain (Bilateral knee's and elbow's). Negative for back pain and myalgias.   Neurological: Negative for dizziness and " headaches.   Endo/Heme/Allergies: Negative for environmental allergies. Does not bruise/bleed easily.   Psychiatric/Behavioral: Negative for depression and suicidal ideas.       Disposition/Barriers to discharge:   Incidental thrombocytopenia    Consultants/Specialty  Hematology    PCP: Pcp Pt States None      Quality Measures  Quality-Core Measures   Reviewed items::  Medications reviewed, Radiology images reviewed, Labs reviewed and EKG reviewed  Aiken catheter::  No Aiken  DVT prophylaxis - mechanical:  SCDs          Physical Exam       Vitals:    06/14/19 0638 06/14/19 0644 06/14/19 0645 06/14/19 0817   BP: (!) 85/51 (!) 87/53 (!) 96/57 (!) 90/55   Pulse:  76  60   Resp:  18 18 18   Temp:    36.1 °C (97 °F)   TempSrc:    Temporal   SpO2:    95%   Weight:       Height:         Body mass index is 25.26 kg/m². Weight: 77.6 kg (171 lb 1.2 oz)  Oxygen Therapy:  Pulse Oximetry: 95 %, O2 (LPM): 0, O2 Delivery: None (Room Air)    Physical Exam   Constitutional: He is oriented to person, place, and time and well-developed, well-nourished, and in no distress. No distress.   HENT:   Head: Normocephalic and atraumatic.   Eyes: Pupils are equal, round, and reactive to light. EOM are normal. Right eye exhibits no discharge.   Neck: Normal range of motion. Neck supple. No thyromegaly present.   Cardiovascular: Normal rate and regular rhythm.    No murmur heard.  Pulmonary/Chest: Effort normal and breath sounds normal. No respiratory distress.   Abdominal: Soft. Bowel sounds are normal. He exhibits no distension.   Musculoskeletal: Normal range of motion. He exhibits no edema or deformity.   Neurological: He is alert and oriented to person, place, and time.   Skin: Skin is warm and dry. He is not diaphoretic. No erythema.   Psychiatric: Mood, memory, affect and judgment normal.             Assessment/Plan     * Thrombocytopenia (HCC)   Assessment & Plan    5 doses of UFH 5000u, last dose 9 days ago  4T's score: 6 points (high  probability).  However, this is been ruled out by heparin antibody test  Potential recent viral illness given the symptoms of arthralgia(bilateral knee and elbows), episode of vomiting/nausea 4 days ago, severe diaphoresis nightly with 10 pound weight loss in the last week  No s/s of bleeding    HIT Ab test negative, HIV, Hep screen negaive  SCDs, patient encouraged to ambulate   Venous US for clot screening is normal   Pending abdominal ultrasound         Type 2 diabetes mellitus with hyperglycemia, without long-term current use of insulin (HCC)- (present on admission)   Assessment & Plan    A1C: 10.6   Uncontrolled with polyneuropathy   -DM diet   Blood sugar 108 this morning.  Reduce Lantus from 16 to 13 units  -Will do meal time insulin while admitted, however patient would benefit from a simplified regimen hopefully up-titrating lantus appropriately will be sufficient until outpatient follow up   -Hold metformin for now, restart once appropriate   -Hypoglycemia protocol      Hypotension due to hypovolemia   Assessment & Plan    Patient clinically appears dry with dry mucus membranes  Severe diaphoresis(soaking up entire bathrobe) waking up at night  Patient is asymptomatic     Plan  Aggressive Fluids

## 2019-06-14 NOTE — ED PROVIDER NOTES
ED Provider Note    CHIEF COMPLAINT  Chief Complaint   Patient presents with   • Sent by MD     evaluation of hyperglycemia, known diabetic        HPI  Aryan Waters is a 69 y.o. male who presents with chief complaint of hyperglycemia, patient was seen for a follow-up with his primary care physician as he has been having increasing blood glucoses over the last few days.  Patient is taking his insulin and metformin regularly and is not missing any doses.  He denies any chest pain.  Patient does report mild cough, myalgias and fever over the last few days.  Patient denies any weight loss.  Patient was recently seen here for DKA and discharged with insulin.  Patient has not started any other new medicines.  Patient denies any easy bleeding, bruising, rash, black stool or bleeding diathesis otherwise.  He has never been told he had low platelets in the past.  Patient denies any hemoptysis    REVIEW OF SYSTEMS  Review of Systems   Constitutional: Positive for malaise/fatigue.   Respiratory: Positive for cough. Negative for hemoptysis.    Cardiovascular: Negative for chest pain.   Genitourinary: Negative for dysuria and urgency.   Psychiatric/Behavioral: Substance abuse: .wet.       See HPI for further details. All other systems are negative.     PAST MEDICAL HISTORY   has a past medical history of Diabetes (HCC).    SOCIAL HISTORY  Social History     Social History Main Topics   • Smoking status: Former Smoker     Types: Cigarettes     Quit date: 6/2/1989   • Smokeless tobacco: Never Used   • Alcohol use No   • Drug use: No   • Sexual activity: Not on file       SURGICAL HISTORY  patient denies any surgical history    CURRENT MEDICATIONS  Home Medications     Reviewed by Sophia Samuels R.N. (Registered Nurse) on 06/13/19 at 1720  Med List Status: Not Addressed   Medication Last Dose Status   atorvastatin (LIPITOR) 20 MG Tab  Active   B Complex Vitamins (VITAMIN B COMPLEX) Tab  Active   Ginseng 50 MG Cap  Active    insulin glargine (LANTUS) 100 UNIT/ML Solution Pen-injector injection  Active   Insulin Pen Needle 32 G x 4 mm  Active   metFORMIN (GLUCOPHAGE) 1000 MG tablet  Active   Pumpkin Seed 500 MG Tab  Active                ALLERGIES  No Known Allergies    PHYSICAL EXAM  Physical Exam   Constitutional: He is oriented to person, place, and time. He appears well-developed and well-nourished.   HENT:   Head: Normocephalic and atraumatic.   Eyes: Pupils are equal, round, and reactive to light. Conjunctivae are normal.   Neck: Normal range of motion. Neck supple.   Cardiovascular: Normal rate and regular rhythm.  Exam reveals no gallop and no friction rub.    No murmur heard.  Pulmonary/Chest: Effort normal and breath sounds normal. No respiratory distress. He has no wheezes.   Abdominal: Soft. Bowel sounds are normal. He exhibits no distension. There is no tenderness. There is no rebound.   Neurological: He is alert and oriented to person, place, and time.   Skin: Skin is warm and dry.   No petechiae, no easy bruising   Psychiatric: He has a normal mood and affect. His behavior is normal.         DIAGNOSTIC STUDIES / PROCEDURES        LABS  Results for orders placed or performed during the hospital encounter of 06/13/19   CBC WITH DIFFERENTIAL   Result Value Ref Range    WBC 6.5 4.8 - 10.8 K/uL    RBC 4.40 (L) 4.70 - 6.10 M/uL    Hemoglobin 13.0 (L) 14.0 - 18.0 g/dL    Hematocrit 39.1 (L) 42.0 - 52.0 %    MCV 88.9 81.4 - 97.8 fL    MCH 29.5 27.0 - 33.0 pg    MCHC 33.2 (L) 33.7 - 35.3 g/dL    RDW 49.1 35.9 - 50.0 fL    Platelet Count 30 (LL) 164 - 446 K/uL    Neutrophils-Polys 75.00 (H) 44.00 - 72.00 %    Lymphocytes 9.80 (L) 22.00 - 41.00 %    Monocytes 15.20 (H) 0.00 - 13.40 %    Eosinophils 0.00 0.00 - 6.90 %    Basophils 0.00 0.00 - 1.80 %    Nucleated RBC 0.00 /100 WBC    Neutrophils (Absolute) 4.88 1.82 - 7.42 K/uL    Lymphs (Absolute) 0.64 (L) 1.00 - 4.80 K/uL    Monos (Absolute) 0.99 (H) 0.00 - 0.85 K/uL    Eos  (Absolute) 0.00 0.00 - 0.51 K/uL    Baso (Absolute) 0.00 0.00 - 0.12 K/uL    NRBC (Absolute) 0.00 K/uL   COMP METABOLIC PANEL   Result Value Ref Range    Sodium 131 (L) 135 - 145 mmol/L    Potassium 4.0 3.6 - 5.5 mmol/L    Chloride 97 96 - 112 mmol/L    Co2 23 20 - 33 mmol/L    Anion Gap 11.0 0.0 - 11.9    Glucose 315 (H) 65 - 99 mg/dL    Bun 24 (H) 8 - 22 mg/dL    Creatinine 1.03 0.50 - 1.40 mg/dL    Calcium 8.4 (L) 8.5 - 10.5 mg/dL    AST(SGOT) 23 12 - 45 U/L    ALT(SGPT) 23 2 - 50 U/L    Alkaline Phosphatase 141 (H) 30 - 99 U/L    Total Bilirubin 0.8 0.1 - 1.5 mg/dL    Albumin 2.8 (L) 3.2 - 4.9 g/dL    Total Protein 6.1 6.0 - 8.2 g/dL    Globulin 3.3 1.9 - 3.5 g/dL    A-G Ratio 0.8 g/dL   ESTIMATED GFR   Result Value Ref Range    GFR If African American >60 >60 mL/min/1.73 m 2    GFR If Non African American >60 >60 mL/min/1.73 m 2   PLATELET ESTIMATE   Result Value Ref Range    Plt Estimation Marked Decrease    MORPHOLOGY   Result Value Ref Range    RBC Morphology Present     Smudge Cells Few    PERIPHERAL SMEAR REVIEW   Result Value Ref Range    Peripheral Smear Review see below    IMMATURE PLT FRACTION   Result Value Ref Range    Imm. Plt Fraction 17.7 (H) 0.6 - 13.1 K/uL   DIFFERENTIAL MANUAL   Result Value Ref Range    Manual Diff Status PERFORMED    Influenza A/B By PCR (Adult - Flu Only)   Result Value Ref Range    Influenza virus A RNA Negative Negative    Influenza virus B, PCR Negative Negative   CBC WITH DIFFERENTIAL   Result Value Ref Range    WBC 6.9 4.8 - 10.8 K/uL    RBC 4.25 (L) 4.70 - 6.10 M/uL    Hemoglobin 12.6 (L) 14.0 - 18.0 g/dL    Hematocrit 37.5 (L) 42.0 - 52.0 %    MCV 88.2 81.4 - 97.8 fL    MCH 29.6 27.0 - 33.0 pg    MCHC 33.6 (L) 33.7 - 35.3 g/dL    RDW 47.9 35.9 - 50.0 fL    Platelet Count 33 (LL) 164 - 446 K/uL    Neutrophils-Polys 78.10 (H) 44.00 - 72.00 %    Lymphocytes 9.60 (L) 22.00 - 41.00 %    Monocytes 11.40 0.00 - 13.40 %    Eosinophils 0.00 0.00 - 6.90 %    Basophils 0.90 0.00  - 1.80 %    Nucleated RBC 0.00 /100 WBC    Neutrophils (Absolute) 5.39 1.82 - 7.42 K/uL    Lymphs (Absolute) 0.66 (L) 1.00 - 4.80 K/uL    Monos (Absolute) 0.79 0.00 - 0.85 K/uL    Eos (Absolute) 0.00 0.00 - 0.51 K/uL    Baso (Absolute) 0.06 0.00 - 0.12 K/uL    NRBC (Absolute) 0.00 K/uL   DIFFERENTIAL MANUAL   Result Value Ref Range    Manual Diff Status PERFORMED    PERIPHERAL SMEAR REVIEW   Result Value Ref Range    Peripheral Smear Review see below    PLATELET ESTIMATE   Result Value Ref Range    Plt Estimation Marked Decrease    MORPHOLOGY   Result Value Ref Range    RBC Morphology Present     Poikilocytosis 1+     Echinocytes 1+     Smudge Cells Few    IMMATURE PLT FRACTION   Result Value Ref Range    Imm. Plt Fraction 19.3 (H) 0.6 - 13.1 K/uL   URINALYSIS (UA)   Result Value Ref Range    Color Yellow     Character Clear     Specific Gravity 1.013 <1.035    Ph 6.0 5.0 - 8.0    Glucose >=1000 (A) Negative mg/dL    Ketones 15 (A) Negative mg/dL    Protein Negative Negative mg/dL    Bilirubin Negative Negative    Urobilinogen, Urine 1.0 Negative    Nitrite Negative Negative    Leukocyte Esterase Negative Negative    Occult Blood Negative Negative    Micro Urine Req see below    Magnesium   Result Value Ref Range    Magnesium 1.9 1.5 - 2.5 mg/dL   HIV AG/AB COMBO ASSAY SCREENING   Result Value Ref Range    HIV Ag/Ab Combo Assay Non Reactive Non Reactive   HEPARIN INDUCED PLATELET AB(HIT)   Result Value Ref Range    Hep Ind Plt Ab Negative Negative   ACCU-CHEK GLUCOSE   Result Value Ref Range    Glucose - Accu-Ck 300 (H) 65 - 99 mg/dL   ACCU-CHEK GLUCOSE   Result Value Ref Range    Glucose - Accu-Ck 232 (H) 65 - 99 mg/dL         RADIOLOGY  US-EXTREMITY VENOUS LOWER BILAT   Final Result      DX-CHEST-2 VIEWS   Final Result      No acute cardiopulmonary abnormality.              COURSE & MEDICAL DECISION MAKING  Pertinent Labs & Imaging studies reviewed. (See chart for details)  Patient here with hyperglycemia, he is  very well-appearing, he does have some symptoms of possible viral syndrome. Patient's basic labs reveal or community acquired pneumonia.  Will check chest x-ray and given the reoccurrence of fluid or community we will also check a influenza PCR.  Hyperglycemia without evidence of DKA, his CBC is concerning however as he has new thrombocytopenia, I will recheck the CBC given that this does appear spurious especially given patient's normal exam.    Patient given IV fluids for hyperglycemia, repeat glucose to be checked on the floor once IV fluids are finished.  Patient CBC again reveals thrombus cytopenia, I have discussed with pharmacy if Lantus is ever been shown to cause thrombocytopenia and they are confident that it is not.  I discussed case with hematology who does not want IgG or steroids at this point, they would like a recheck of fluids tomorrow morning.  Patient will be admitted for ongoing work-up.  Glucose is improved following IV fluids      FINAL IMPRESSION  1.  Idiopathic thrombocytopenia, hyperglycemia    Electronically signed by: Aryan Morris, 6/13/2019 6:58 PM

## 2019-06-14 NOTE — ASSESSMENT & PLAN NOTE
Patient clinically appears dry with dry mucus membranes  Severe diaphoresis(soaking up entire bathrobe) waking up at night  Patient is asymptomatic   Cortisol ordered    Plan  We will try hydrocortisone in a.m.

## 2019-06-14 NOTE — ASSESSMENT & PLAN NOTE
A1C: 10.6   Uncontrolled with polyneuropathy   -DM diet   Blood sugar 108 this morning.    -Lantus 15 units  -Will do meal time insulin while admitted, however patient would benefit from a simplified regimen hopefully up-titrating lantus appropriately will be sufficient until outpatient follow up   -Hold metformin for now, restart once appropriate   -Hypoglycemia protocol

## 2019-06-14 NOTE — CARE PLAN
Problem: Venous Thromboembolism (VTW)/Deep Vein Thrombosis (DVT) Prevention:  Goal: Patient will participate in Venous Thrombosis (VTE)/Deep Vein Thrombosis (DVT)Prevention Measures  Outcome: PROGRESSING AS EXPECTED      Problem: Medication  Goal: Compliance with prescribed medication will improve  Outcome: PROGRESSING AS EXPECTED      Problem: Knowledge Deficit  Goal: Knowledge of disease process/condition, treatment plan, diagnostic tests, and medications will improve  Outcome: PROGRESSING AS EXPECTED    Goal: Knowledge of the prescribed therapeutic regimen will improve  Outcome: PROGRESSING AS EXPECTED

## 2019-06-15 ENCOUNTER — APPOINTMENT (OUTPATIENT)
Dept: RADIOLOGY | Facility: MEDICAL CENTER | Age: 69
DRG: 813 | End: 2019-06-15
Attending: STUDENT IN AN ORGANIZED HEALTH CARE EDUCATION/TRAINING PROGRAM
Payer: COMMERCIAL

## 2019-06-15 LAB
ALBUMIN SERPL BCP-MCNC: 2 G/DL (ref 3.2–4.9)
ALBUMIN/GLOB SERPL: 1 G/DL
ALP SERPL-CCNC: 86 U/L (ref 30–99)
ALT SERPL-CCNC: 11 U/L (ref 2–50)
ANION GAP SERPL CALC-SCNC: 5 MMOL/L (ref 0–11.9)
ANION GAP SERPL CALC-SCNC: 5 MMOL/L (ref 0–11.9)
AST SERPL-CCNC: 10 U/L (ref 12–45)
BILIRUB SERPL-MCNC: 0.4 MG/DL (ref 0.1–1.5)
BUN SERPL-MCNC: 17 MG/DL (ref 8–22)
BUN SERPL-MCNC: 17 MG/DL (ref 8–22)
CALCIUM SERPL-MCNC: 6.9 MG/DL (ref 8.5–10.5)
CALCIUM SERPL-MCNC: 6.9 MG/DL (ref 8.5–10.5)
CHLORIDE SERPL-SCNC: 110 MMOL/L (ref 96–112)
CHLORIDE SERPL-SCNC: 110 MMOL/L (ref 96–112)
CO2 SERPL-SCNC: 21 MMOL/L (ref 20–33)
CO2 SERPL-SCNC: 21 MMOL/L (ref 20–33)
CREAT SERPL-MCNC: 0.77 MG/DL (ref 0.5–1.4)
CREAT SERPL-MCNC: 0.77 MG/DL (ref 0.5–1.4)
ERYTHROCYTE [DISTWIDTH] IN BLOOD BY AUTOMATED COUNT: 50.6 FL (ref 35.9–50)
FERRITIN SERPL-MCNC: 112.8 NG/ML (ref 22–322)
GLOBULIN SER CALC-MCNC: 2.1 G/DL (ref 1.9–3.5)
GLUCOSE BLD-MCNC: 138 MG/DL (ref 65–99)
GLUCOSE SERPL-MCNC: 227 MG/DL (ref 65–99)
GLUCOSE SERPL-MCNC: 227 MG/DL (ref 65–99)
HAPTOGLOB SERPL-MCNC: 206 MG/DL (ref 30–200)
HCT VFR BLD AUTO: 28.3 % (ref 42–52)
HEMOCCULT SP1 STL QL: NEGATIVE
HGB BLD-MCNC: 9.3 G/DL (ref 14–18)
HGB RETIC QN AUTO: 24.7 PG/CELL (ref 29–35)
IMM RETICS NFR: 12.6 % (ref 9.3–17.4)
IRON SATN MFR SERPL: 65 % (ref 15–55)
IRON SERPL-MCNC: 119 UG/DL (ref 50–180)
MCH RBC QN AUTO: 30.2 PG (ref 27–33)
MCHC RBC AUTO-ENTMCNC: 32.9 G/DL (ref 33.7–35.3)
MCV RBC AUTO: 91.9 FL (ref 81.4–97.8)
PLATELET # BLD AUTO: 53 K/UL (ref 164–446)
PMV BLD AUTO: 12.1 FL (ref 9–12.9)
POTASSIUM SERPL-SCNC: 3.5 MMOL/L (ref 3.6–5.5)
POTASSIUM SERPL-SCNC: 3.5 MMOL/L (ref 3.6–5.5)
PROT SERPL-MCNC: 4.1 G/DL (ref 6–8.2)
RBC # BLD AUTO: 3.08 M/UL (ref 4.7–6.1)
RETICS # AUTO: 0.01 M/UL (ref 0.04–0.06)
RETICS/RBC NFR: 0.2 % (ref 0.8–2.1)
SODIUM SERPL-SCNC: 136 MMOL/L (ref 135–145)
SODIUM SERPL-SCNC: 136 MMOL/L (ref 135–145)
TIBC SERPL-MCNC: 182 UG/DL (ref 250–450)
WBC # BLD AUTO: 5.4 K/UL (ref 4.8–10.8)

## 2019-06-15 PROCEDURE — 76700 US EXAM ABDOM COMPLETE: CPT

## 2019-06-15 PROCEDURE — 700102 HCHG RX REV CODE 250 W/ 637 OVERRIDE(OP): Performed by: STUDENT IN AN ORGANIZED HEALTH CARE EDUCATION/TRAINING PROGRAM

## 2019-06-15 PROCEDURE — G0378 HOSPITAL OBSERVATION PER HR: HCPCS

## 2019-06-15 PROCEDURE — 96372 THER/PROPH/DIAG INJ SC/IM: CPT

## 2019-06-15 PROCEDURE — 99226 PR SUBSEQUENT OBSERVATION CARE,LEVEL III: CPT | Mod: GC | Performed by: HOSPITALIST

## 2019-06-15 PROCEDURE — 80053 COMPREHEN METABOLIC PANEL: CPT

## 2019-06-15 PROCEDURE — 83550 IRON BINDING TEST: CPT

## 2019-06-15 PROCEDURE — 82962 GLUCOSE BLOOD TEST: CPT

## 2019-06-15 PROCEDURE — 85027 COMPLETE CBC AUTOMATED: CPT

## 2019-06-15 PROCEDURE — 82728 ASSAY OF FERRITIN: CPT

## 2019-06-15 PROCEDURE — A9270 NON-COVERED ITEM OR SERVICE: HCPCS | Performed by: STUDENT IN AN ORGANIZED HEALTH CARE EDUCATION/TRAINING PROGRAM

## 2019-06-15 PROCEDURE — 83540 ASSAY OF IRON: CPT

## 2019-06-15 PROCEDURE — 700105 HCHG RX REV CODE 258: Performed by: STUDENT IN AN ORGANIZED HEALTH CARE EDUCATION/TRAINING PROGRAM

## 2019-06-15 PROCEDURE — 85046 RETICYTE/HGB CONCENTRATE: CPT

## 2019-06-15 RX ORDER — POTASSIUM CHLORIDE 20 MEQ/1
40 TABLET, EXTENDED RELEASE ORAL ONCE
Status: COMPLETED | OUTPATIENT
Start: 2019-06-15 | End: 2019-06-15

## 2019-06-15 RX ORDER — INSULIN GLARGINE 100 [IU]/ML
15 INJECTION, SOLUTION SUBCUTANEOUS EVERY EVENING
Status: DISCONTINUED | OUTPATIENT
Start: 2019-06-15 | End: 2019-06-17 | Stop reason: HOSPADM

## 2019-06-15 RX ADMIN — POTASSIUM CHLORIDE 40 MEQ: 1500 TABLET, EXTENDED RELEASE ORAL at 05:04

## 2019-06-15 RX ADMIN — SODIUM CHLORIDE: 9 INJECTION, SOLUTION INTRAVENOUS at 12:24

## 2019-06-15 RX ADMIN — SODIUM CHLORIDE: 9 INJECTION, SOLUTION INTRAVENOUS at 21:32

## 2019-06-15 RX ADMIN — INSULIN GLARGINE 15 UNITS: 100 INJECTION, SOLUTION SUBCUTANEOUS at 18:44

## 2019-06-15 RX ADMIN — VITAMIN C 1 TABLET: TAB at 05:04

## 2019-06-15 ASSESSMENT — ENCOUNTER SYMPTOMS
CHILLS: 1
BRUISES/BLEEDS EASILY: 0
VOMITING: 1
BLOOD IN STOOL: 0
HEADACHES: 0
COUGH: 0
DOUBLE VISION: 0
HEMOPTYSIS: 0
PALPITATIONS: 0
HEARTBURN: 0
BLURRED VISION: 0
ABDOMINAL PAIN: 0
DIAPHORESIS: 1
FEVER: 0
DEPRESSION: 0
MYALGIAS: 0
BACK PAIN: 0
DIARRHEA: 1
DIZZINESS: 0

## 2019-06-15 NOTE — PROGRESS NOTES
Bedside report received at 1900. Assessment done. Bp at 93/52, IV fluids infusing per MAR otherwise VSS. Telemonitored, SR. AOx4, calm demeanor pt denies lethargy, dizziness, HA at this time. Niece at bedside, upated on POC.  Unlabored and even breathing, RA. No pain at this time. Skin intact no obvious signs of bleeding noted, plt count 38 MD notified. 75% of dinner finished. Hourly patient rounding done. Fall precautions in place. Call light in place, bed locked and in lowest position. White board updated. Reviewed POC. All questions answered at this time.

## 2019-06-15 NOTE — PROGRESS NOTES
MD Ronquillo notified. Potassium and Calcium replacement ordered. Also notified Shima on fsbs [id number], stated current medication regimen is sufficient and will notify dayshift team. CMP ordered and lab called per MD request

## 2019-06-15 NOTE — PROGRESS NOTES
Internal Medicine Interval Note  Note Author: August Wood M.D.     Name Aryan Dominguez 1950   Age/Sex 69 y.o. male   MRN [id number]   Code Status DNR     After 5PM or if no immediate response to page, please call for cross-coverage  Attending/Team: Dr. Ray/Nikki See Patient List for primary contact information  Call [phone] to page    1st Call - Day Intern (R1):   Dr. Wood 2nd Call - Day Sr. Resident (R2/R3):   Dr. Castro         Reason for interval visit  (Principal Problem)   Incidental Thrombocytopenia      Interval Problem Daily Status Update  (24 hours, problem oriented, brief subjective history, new lab/imaging data pertinent to that problem)   Patient was seen resting comfortably in bed.  Reports improvement in symptoms. Patient still appears somewhat lethargic    -Thrombocytopenia improving.  Platelets increased from 30s to 50s  -Continues to be hypotensive.  Cortisol ordered to establish underlying cause of her persistent hypertension  - Continue fluids.   - Lantus 15 units  - Abdominal ultrasound to establish hepatosplenomegaly        Review of Systems   Constitutional: Positive for chills and diaphoresis (Marked every night for the last 4 nights). Negative for fever.   HENT: Negative for hearing loss and tinnitus.    Eyes: Negative for blurred vision and double vision.   Respiratory: Negative for cough and hemoptysis.    Cardiovascular: Negative for chest pain and palpitations.   Gastrointestinal: Positive for diarrhea and vomiting. Negative for abdominal pain, blood in stool, heartburn and melena.   Genitourinary: Negative for dysuria and urgency.   Musculoskeletal: Positive for joint pain (Bilateral knee's and elbow's). Negative for back pain and myalgias.   Neurological: Negative for dizziness and headaches.   Endo/Heme/Allergies: Negative for environmental allergies. Does not bruise/bleed easily.   Psychiatric/Behavioral: Negative for depression and suicidal ideas.        Disposition/Barriers to discharge:   Incidental thrombocytopenia    Consultants/Specialty  Hematology    PCP: Pcp Pt States None      Quality Measures  Quality-Core Measures   Reviewed items::  Medications reviewed, Radiology images reviewed, Labs reviewed and EKG reviewed  Aiken catheter::  No Aiken  DVT prophylaxis - mechanical:  SCDs          Physical Exam       Vitals:    06/14/19 2206 06/14/19 2358 06/15/19 0451 06/15/19 0808   BP: (!) 90/52 106/59 (!) 97/53 (!) 92/60   Pulse: 64 76 (!) 55 68   Resp: 17 20 18 18   Temp: 36.4 °C (97.6 °F) 37.1 °C (98.7 °F) 36.4 °C (97.6 °F) 36.3 °C (97.4 °F)   TempSrc: Temporal Temporal Temporal Temporal   SpO2: 95% 99% 95% 95%   Weight:       Height:         Body mass index is 25.26 kg/m².    Oxygen Therapy:  Pulse Oximetry: 95 %, O2 (LPM): 0, O2 Delivery: None (Room Air)    Physical Exam   Constitutional: He is oriented to person, place, and time and well-developed, well-nourished, and in no distress. No distress.   HENT:   Head: Normocephalic and atraumatic.   Eyes: Pupils are equal, round, and reactive to light. EOM are normal. Right eye exhibits no discharge.   Neck: Normal range of motion. Neck supple. No thyromegaly present.   Cardiovascular: Normal rate and regular rhythm.    No murmur heard.  Pulmonary/Chest: Effort normal and breath sounds normal. No respiratory distress.   Abdominal: Soft. Bowel sounds are normal. He exhibits no distension.   Musculoskeletal: Normal range of motion. He exhibits no edema or deformity.   Neurological: He is alert and oriented to person, place, and time.   Skin: Skin is warm and dry. He is not diaphoretic. No erythema.   Psychiatric: Mood, memory, affect and judgment normal.             Assessment/Plan     * Thrombocytopenia (HCC)   Assessment & Plan    5 doses of UFH 5000u, last dose 9 days ago  4T's score: 6 points (high probability).  However, this is been ruled out by heparin antibody test  Potential recent viral illness given  the symptoms of arthralgia(bilateral knee and elbows), episode of vomiting/nausea 4 days ago, severe diaphoresis nightly with 10 pound weight loss in the last week  No s/s of bleeding    HIT Ab test negative, HIV, Hep screen negaive  -SCDs, patient encouraged to ambulate   -Thrombocytopenia improving.  Platelets increased from 30s to 50s  - Continue fluids.   - Lantus 15 units  - Abdominal ultrasound to establish hepatosplenomegaly         Type 2 diabetes mellitus with hyperglycemia, without long-term current use of insulin (HCC)- (present on admission)   Assessment & Plan    A1C: 10.6   Uncontrolled with polyneuropathy   -DM diet   Blood sugar 108 this morning.    -Lantus 15 units  -Will do meal time insulin while admitted, however patient would benefit from a simplified regimen hopefully up-titrating lantus appropriately will be sufficient until outpatient follow up   -Hold metformin for now, restart once appropriate   -Hypoglycemia protocol      Hypotension due to hypovolemia   Assessment & Plan    Patient clinically appears dry with dry mucus membranes  Severe diaphoresis(soaking up entire bathrobe) waking up at night  Patient is asymptomatic   Cortisol ordered    Plan  Aggressive Fluids

## 2019-06-15 NOTE — CARE PLAN
Problem: Safety  Goal: Will remain free from injury  Outcome: PROGRESSING AS EXPECTED  Pt high risk fo bleeding, no signs noted at this time will continue to monitor, no anticoagulants in use    Problem: Psychosocial Needs:  Goal: Level of anxiety will decrease  Outcome: PROGRESSING AS EXPECTED  POC discussed, verbalized understanding and decreased anxiety

## 2019-06-16 ENCOUNTER — APPOINTMENT (OUTPATIENT)
Dept: RADIOLOGY | Facility: MEDICAL CENTER | Age: 69
DRG: 813 | End: 2019-06-16
Attending: STUDENT IN AN ORGANIZED HEALTH CARE EDUCATION/TRAINING PROGRAM
Payer: COMMERCIAL

## 2019-06-16 LAB
25(OH)D3 SERPL-MCNC: 36 NG/ML (ref 30–100)
ALBUMIN SERPL BCP-MCNC: 2 G/DL (ref 3.2–4.9)
ANION GAP SERPL CALC-SCNC: 6 MMOL/L (ref 0–11.9)
BASOPHILS # BLD AUTO: 1.8 % (ref 0–1.8)
BASOPHILS # BLD: 0.08 K/UL (ref 0–0.12)
BUN SERPL-MCNC: 11 MG/DL (ref 8–22)
BURR CELLS BLD QL SMEAR: NORMAL
CA-I SERPL-SCNC: 1.1 MMOL/L (ref 1.1–1.3)
CALCIUM SERPL-MCNC: 5.5 MG/DL (ref 8.5–10.5)
CALCIUM SERPL-MCNC: 7.1 MG/DL (ref 8.5–10.5)
CHLORIDE SERPL-SCNC: 120 MMOL/L (ref 96–112)
CMV IGG SERPL IA-ACNC: <0.2 U/ML
CMV IGM SERPL IA-ACNC: 13.7 AU/ML
CO2 SERPL-SCNC: 18 MMOL/L (ref 20–33)
CORTIS SERPL-MCNC: 3.4 UG/DL (ref 0–23)
CREAT SERPL-MCNC: 0.51 MG/DL (ref 0.5–1.4)
EOSINOPHIL # BLD AUTO: 0.08 K/UL (ref 0–0.51)
EOSINOPHIL NFR BLD: 1.7 % (ref 0–6.9)
ERYTHROCYTE [DISTWIDTH] IN BLOOD BY AUTOMATED COUNT: 51.2 FL (ref 35.9–50)
GLUCOSE BLD-MCNC: 257 MG/DL (ref 65–99)
GLUCOSE BLD-MCNC: 276 MG/DL (ref 65–99)
GLUCOSE SERPL-MCNC: 158 MG/DL (ref 65–99)
HCT VFR BLD AUTO: 26.1 % (ref 42–52)
HGB BLD-MCNC: 8.4 G/DL (ref 14–18)
LYMPHOCYTES # BLD AUTO: 0.93 K/UL (ref 1–4.8)
LYMPHOCYTES NFR BLD: 20.2 % (ref 22–41)
MANUAL DIFF BLD: NORMAL
MCH RBC QN AUTO: 29.8 PG (ref 27–33)
MCHC RBC AUTO-ENTMCNC: 32.2 G/DL (ref 33.7–35.3)
MCV RBC AUTO: 92.6 FL (ref 81.4–97.8)
MONOCYTES # BLD AUTO: 0.2 K/UL (ref 0–0.85)
MONOCYTES NFR BLD AUTO: 4.4 % (ref 0–13.4)
MORPHOLOGY BLD-IMP: NORMAL
NEUTROPHILS # BLD AUTO: 3.31 K/UL (ref 1.82–7.42)
NEUTROPHILS NFR BLD: 71.9 % (ref 44–72)
NRBC # BLD AUTO: 0 K/UL
NRBC BLD-RTO: 0 /100 WBC
OVALOCYTES BLD QL SMEAR: NORMAL
PLATELET # BLD AUTO: 78 K/UL (ref 164–446)
PLATELET BLD QL SMEAR: NORMAL
PMV BLD AUTO: 12 FL (ref 9–12.9)
POIKILOCYTOSIS BLD QL SMEAR: NORMAL
POTASSIUM SERPL-SCNC: 3.1 MMOL/L (ref 3.6–5.5)
POTASSIUM SERPL-SCNC: 3.9 MMOL/L (ref 3.6–5.5)
PTH-INTACT SERPL-MCNC: 26.5 PG/ML (ref 14–72)
RBC # BLD AUTO: 2.82 M/UL (ref 4.7–6.1)
RBC BLD AUTO: PRESENT
SODIUM SERPL-SCNC: 144 MMOL/L (ref 135–145)
WBC # BLD AUTO: 4.6 K/UL (ref 4.8–10.8)

## 2019-06-16 PROCEDURE — 84132 ASSAY OF SERUM POTASSIUM: CPT

## 2019-06-16 PROCEDURE — 82330 ASSAY OF CALCIUM: CPT

## 2019-06-16 PROCEDURE — 74170 CT ABD WO CNTRST FLWD CNTRST: CPT

## 2019-06-16 PROCEDURE — 82310 ASSAY OF CALCIUM: CPT

## 2019-06-16 PROCEDURE — 96372 THER/PROPH/DIAG INJ SC/IM: CPT

## 2019-06-16 PROCEDURE — 700105 HCHG RX REV CODE 258: Performed by: STUDENT IN AN ORGANIZED HEALTH CARE EDUCATION/TRAINING PROGRAM

## 2019-06-16 PROCEDURE — 306637 HCHG MISC ORTHO ITEM RC 0274

## 2019-06-16 PROCEDURE — 96365 THER/PROPH/DIAG IV INF INIT: CPT

## 2019-06-16 PROCEDURE — G0378 HOSPITAL OBSERVATION PER HR: HCPCS

## 2019-06-16 PROCEDURE — A9270 NON-COVERED ITEM OR SERVICE: HCPCS | Performed by: STUDENT IN AN ORGANIZED HEALTH CARE EDUCATION/TRAINING PROGRAM

## 2019-06-16 PROCEDURE — 700102 HCHG RX REV CODE 250 W/ 637 OVERRIDE(OP): Performed by: STUDENT IN AN ORGANIZED HEALTH CARE EDUCATION/TRAINING PROGRAM

## 2019-06-16 PROCEDURE — 97161 PT EVAL LOW COMPLEX 20 MIN: CPT

## 2019-06-16 PROCEDURE — 85027 COMPLETE CBC AUTOMATED: CPT

## 2019-06-16 PROCEDURE — 700111 HCHG RX REV CODE 636 W/ 250 OVERRIDE (IP): Performed by: STUDENT IN AN ORGANIZED HEALTH CARE EDUCATION/TRAINING PROGRAM

## 2019-06-16 PROCEDURE — 96366 THER/PROPH/DIAG IV INF ADDON: CPT

## 2019-06-16 PROCEDURE — 82306 VITAMIN D 25 HYDROXY: CPT

## 2019-06-16 PROCEDURE — 85007 BL SMEAR W/DIFF WBC COUNT: CPT

## 2019-06-16 PROCEDURE — 83970 ASSAY OF PARATHORMONE: CPT

## 2019-06-16 PROCEDURE — 99233 SBSQ HOSP IP/OBS HIGH 50: CPT | Mod: GC | Performed by: HOSPITALIST

## 2019-06-16 PROCEDURE — 700117 HCHG RX CONTRAST REV CODE 255: Performed by: STUDENT IN AN ORGANIZED HEALTH CARE EDUCATION/TRAINING PROGRAM

## 2019-06-16 PROCEDURE — 82962 GLUCOSE BLOOD TEST: CPT

## 2019-06-16 PROCEDURE — 82533 TOTAL CORTISOL: CPT

## 2019-06-16 PROCEDURE — 82040 ASSAY OF SERUM ALBUMIN: CPT

## 2019-06-16 PROCEDURE — 80048 BASIC METABOLIC PNL TOTAL CA: CPT

## 2019-06-16 RX ORDER — POTASSIUM CHLORIDE 7.45 MG/ML
10 INJECTION INTRAVENOUS
Status: COMPLETED | OUTPATIENT
Start: 2019-06-16 | End: 2019-06-16

## 2019-06-16 RX ORDER — CALCIUM CARBONATE 500 MG/1
1000 TABLET, CHEWABLE ORAL ONCE
Status: COMPLETED | OUTPATIENT
Start: 2019-06-16 | End: 2019-06-16

## 2019-06-16 RX ORDER — POTASSIUM CHLORIDE 20 MEQ/1
60 TABLET, EXTENDED RELEASE ORAL ONCE
Status: DISCONTINUED | OUTPATIENT
Start: 2019-06-16 | End: 2019-06-16

## 2019-06-16 RX ORDER — HYDROCORTISONE 10 MG/1
10 TABLET ORAL DAILY
Status: DISCONTINUED | OUTPATIENT
Start: 2019-06-16 | End: 2019-06-17

## 2019-06-16 RX ORDER — POTASSIUM CHLORIDE 20 MEQ/1
40 TABLET, EXTENDED RELEASE ORAL ONCE
Status: COMPLETED | OUTPATIENT
Start: 2019-06-16 | End: 2019-06-16

## 2019-06-16 RX ADMIN — ANTACID TABLETS 1000 MG: 500 TABLET, CHEWABLE ORAL at 06:00

## 2019-06-16 RX ADMIN — POTASSIUM CHLORIDE 10 MEQ: 7.46 INJECTION, SOLUTION INTRAVENOUS at 10:25

## 2019-06-16 RX ADMIN — POTASSIUM CHLORIDE 40 MEQ: 1500 TABLET, EXTENDED RELEASE ORAL at 05:59

## 2019-06-16 RX ADMIN — SODIUM CHLORIDE: 9 INJECTION, SOLUTION INTRAVENOUS at 07:27

## 2019-06-16 RX ADMIN — VITAMIN C 1 TABLET: TAB at 05:59

## 2019-06-16 RX ADMIN — IOHEXOL 100 ML: 350 INJECTION, SOLUTION INTRAVENOUS at 17:55

## 2019-06-16 RX ADMIN — POTASSIUM CHLORIDE 10 MEQ: 7.46 INJECTION, SOLUTION INTRAVENOUS at 06:59

## 2019-06-16 RX ADMIN — HYDROCORTISONE 10 MG: 10 TABLET ORAL at 16:17

## 2019-06-16 RX ADMIN — POTASSIUM CHLORIDE 10 MEQ: 7.46 INJECTION, SOLUTION INTRAVENOUS at 09:22

## 2019-06-16 RX ADMIN — INSULIN GLARGINE 15 UNITS: 100 INJECTION, SOLUTION SUBCUTANEOUS at 17:28

## 2019-06-16 RX ADMIN — POTASSIUM CHLORIDE 10 MEQ: 7.46 INJECTION, SOLUTION INTRAVENOUS at 08:21

## 2019-06-16 ASSESSMENT — COGNITIVE AND FUNCTIONAL STATUS - GENERAL
SUGGESTED CMS G CODE MODIFIER MOBILITY: CH
MOBILITY SCORE: 24

## 2019-06-16 ASSESSMENT — ENCOUNTER SYMPTOMS
HEADACHES: 0
DIARRHEA: 1
CHILLS: 1
HEMOPTYSIS: 0
BLURRED VISION: 0
BRUISES/BLEEDS EASILY: 0
ABDOMINAL PAIN: 0
HEARTBURN: 0
VOMITING: 1
PALPITATIONS: 0
BLOOD IN STOOL: 0
DIAPHORESIS: 1
DOUBLE VISION: 0
BACK PAIN: 0
DIZZINESS: 0
FEVER: 0
COUGH: 0
MYALGIAS: 0
DEPRESSION: 0

## 2019-06-16 ASSESSMENT — GAIT ASSESSMENTS
GAIT LEVEL OF ASSIST: SUPERVISED
DISTANCE (FEET): 500

## 2019-06-16 NOTE — PROGRESS NOTES
Physician returned page.  Informed physician of pt's request.  Physician to see patient shortly, pt updated

## 2019-06-16 NOTE — PROGRESS NOTES
Pt updated on POC: new order for CT. Consent for IV contrast injection signed by patient, placed in chart.

## 2019-06-16 NOTE — PROGRESS NOTES
"       Internal Medicine Interval Note  Note Author: August Wood M.D.     Name Aryan Dominguez     1950   Age/Sex 69 y.o. male   MRN [id number]   Code Status DNR     After 5PM or if no immediate response to page, please call for cross-coverage  Attending/Team: Dr. Ray/Nikki See Patient List for primary contact information  Call [phone] to page    1st Call - Day Intern (R1):   Dr. Wood 2nd Call - Day Sr. Resident (R2/R3):   Dr. Castro         Reason for interval visit  (Principal Problem)   Incidental Thrombocytopenia      Interval Problem Daily Status Update  (24 hours, problem oriented, brief subjective history, new lab/imaging data pertinent to that problem)   Patient was seen resting comfortably in bed.  Reports improvement in symptoms. Patient still appears somewhat lethargic    -Thrombocytopenia improving.  Platelets increased from 30's to 50's to 70's  -Continues to be hypotensive.  Low Cortisol likely causing Hypotension.  We will start hydrocortisone in a.m.  -Hgb continuously dropping to 8.1 from 13 on . Reticulocyte count suggest bone marrow is not responding. Normal iron studies.   -Will consult Hematology for further input. This pancytopenia is likely secondary to Viral etiology. Pending multiple viral studies.   - Continue fluids.   - Lantus 15 units  - Abdominal ultrasound did not show hepatosplenomegaly, however it did show \"Hypoechoic nodule adjacent to the inferior right kidney could represent an exophytic cyst but a solid mass is not excluded. Further evaluation with CT is recommended.\"  -CT abdomen ordered        Review of Systems   Constitutional: Positive for chills and diaphoresis (Marked every night for the last 4 nights). Negative for fever.   HENT: Negative for hearing loss and tinnitus.    Eyes: Negative for blurred vision and double vision.   Respiratory: Negative for cough and hemoptysis.    Cardiovascular: Negative for chest pain and palpitations. "   Gastrointestinal: Positive for diarrhea and vomiting. Negative for abdominal pain, blood in stool, heartburn and melena.   Genitourinary: Negative for dysuria and urgency.   Musculoskeletal: Positive for joint pain (Bilateral knee's and elbow's). Negative for back pain and myalgias.   Neurological: Negative for dizziness and headaches.   Endo/Heme/Allergies: Negative for environmental allergies. Does not bruise/bleed easily.   Psychiatric/Behavioral: Negative for depression and suicidal ideas.       Disposition/Barriers to discharge:   Incidental thrombocytopenia    Consultants/Specialty  Hematology    PCP: Pcp Pt States None      Quality Measures  Quality-Core Measures   Reviewed items::  Medications reviewed, Radiology images reviewed, Labs reviewed and EKG reviewed  Aiken catheter::  No Aiken  DVT prophylaxis - mechanical:  SCDs          Physical Exam       Vitals:    06/15/19 2058 06/16/19 0008 06/16/19 0504 06/16/19 0806   BP: (!) 93/54 123/62 (!) 96/54 (!) 99/56   Pulse: 62 61 (!) 55 (!) 55   Resp: 16 18 18 18   Temp: 36.4 °C (97.5 °F) 36.5 °C (97.7 °F) 36.4 °C (97.6 °F) 36.2 °C (97.2 °F)   TempSrc: Temporal Temporal Temporal Temporal   SpO2: 96% 97% 94% 98%   Weight:       Height:         Body mass index is 25.26 kg/m².    Oxygen Therapy:  Pulse Oximetry: 98 %, O2 (LPM): 0, O2 Delivery: None (Room Air)    Physical Exam   Constitutional: He is oriented to person, place, and time and well-developed, well-nourished, and in no distress. No distress.   HENT:   Head: Normocephalic and atraumatic.   Eyes: Pupils are equal, round, and reactive to light. EOM are normal. Right eye exhibits no discharge.   Neck: Normal range of motion. Neck supple. No thyromegaly present.   Cardiovascular: Normal rate and regular rhythm.    No murmur heard.  Pulmonary/Chest: Effort normal and breath sounds normal. No respiratory distress.   Abdominal: Soft. Bowel sounds are normal. He exhibits no distension.   Musculoskeletal: Normal  "range of motion. He exhibits no edema or deformity.   Neurological: He is alert and oriented to person, place, and time.   Skin: Skin is warm and dry. He is not diaphoretic. No erythema.   Psychiatric: Mood, memory, affect and judgment normal.             Assessment/Plan     * Thrombocytopenia (HCC)   Assessment & Plan    5 doses of UFH 5000u, last dose 9 days ago  4T's score: 6 points (high probability).  However, this is been ruled out by heparin antibody test  Potential recent viral illness given the symptoms of arthralgia(bilateral knee and elbows), episode of vomiting/nausea 4 days ago, severe diaphoresis nightly with 10 pound weight loss in the last week  No s/s of bleeding    HIT Ab test negative, HIV, Hep screen negaive  -SCDs, patient encouraged to ambulate   -Thrombocytopenia improving.  Platelets increased from 30s to 50s to 70's  - Lantus 15 units  - Abdominal ultrasound did not show hepatosplenomegaly, however it did show \"Hypoechoic nodule adjacent to the inferior right kidney could represent an exophytic cyst but a solid mass is not excluded. Further evaluation with CT is recommended.\"  -CT abdomen ordered         Type 2 diabetes mellitus with hyperglycemia, without long-term current use of insulin (HCC)- (present on admission)   Assessment & Plan    A1C: 10.6   Uncontrolled with polyneuropathy   -DM diet   Blood sugar 108 this morning.    -Lantus 15 units  -Will do meal time insulin while admitted, however patient would benefit from a simplified regimen hopefully up-titrating lantus appropriately will be sufficient until outpatient follow up   -Hold metformin for now, restart once appropriate   -Hypoglycemia protocol      Hypotension due to hypovolemia   Assessment & Plan    Patient clinically appears dry with dry mucus membranes  Severe diaphoresis(soaking up entire bathrobe) waking up at night  Patient is asymptomatic   Cortisol ordered    Plan  We will try hydrocortisone in a.m.           "

## 2019-06-16 NOTE — PROGRESS NOTES
Sundeep  called with critical Ca 5.5 from 6.9 as well as 3.1 potassium. Called MD, stated to expect him to put in orders

## 2019-06-16 NOTE — THERAPY
"70 y/o male adm for fever and chills dx; thrombocytopenia. intact motor , sensory and coordination LE, Intact higher level balance activities. As per pt , he has been ambulating the hallways frequently throughout the day. No reports of light headedness /dizziness during mobility. No further acute PT services required at this time.    Physical Therapy Evaluation completed.   Bed Mobility:  Supine to Sit: Independent  Transfers: Sit to Stand: Independent  Gait: Level Of Assist: Supervised with No Equipment Needed       Plan of Care: Patient with no further skilled PT needs in the acute care setting at this time  Discharge Recommendations: Equipment: No Equipment Needed. Post-acute therapy Currently anticipate no further skilled therapy needs once patient is discharged from the inpatient setting.    See \"Rehab Therapy-Acute\" Patient Summary Report for complete documentation.     "

## 2019-06-16 NOTE — PROGRESS NOTES
Bedside report received at 1900. Assessment done. VVS. AOx4, calm no deficit. Unlabored and even breathing, RA. No pain at this time, denies dizziness,lethargy. Skin intact no signs of bleeding. 100% dinner finished. Hourly patient rounding done. Fall precautions in place. Call light in place, bed locked and in lowest position. White board updated. Reviewed POC. All questions answered at this time.

## 2019-06-17 ENCOUNTER — PATIENT OUTREACH (OUTPATIENT)
Dept: HEALTH INFORMATION MANAGEMENT | Facility: OTHER | Age: 69
End: 2019-06-17

## 2019-06-17 VITALS
HEIGHT: 69 IN | HEART RATE: 62 BPM | WEIGHT: 171.08 LBS | SYSTOLIC BLOOD PRESSURE: 110 MMHG | OXYGEN SATURATION: 96 % | TEMPERATURE: 98 F | RESPIRATION RATE: 13 BRPM | DIASTOLIC BLOOD PRESSURE: 56 MMHG | BODY MASS INDEX: 25.34 KG/M2

## 2019-06-17 LAB
ALBUMIN SERPL BCP-MCNC: 2.3 G/DL (ref 3.2–4.9)
ALBUMIN/GLOB SERPL: 1 G/DL
ALP SERPL-CCNC: 79 U/L (ref 30–99)
ALT SERPL-CCNC: 11 U/L (ref 2–50)
ANION GAP SERPL CALC-SCNC: 4 MMOL/L (ref 0–11.9)
AST SERPL-CCNC: 9 U/L (ref 12–45)
BILIRUB SERPL-MCNC: 0.4 MG/DL (ref 0.1–1.5)
BUN SERPL-MCNC: 12 MG/DL (ref 8–22)
CALCIUM SERPL-MCNC: 7.8 MG/DL (ref 8.5–10.5)
CHLORIDE SERPL-SCNC: 109 MMOL/L (ref 96–112)
CO2 SERPL-SCNC: 26 MMOL/L (ref 20–33)
CREAT SERPL-MCNC: 0.8 MG/DL (ref 0.5–1.4)
ERYTHROCYTE [DISTWIDTH] IN BLOOD BY AUTOMATED COUNT: 49.2 FL (ref 35.9–50)
GLOBULIN SER CALC-MCNC: 2.3 G/DL (ref 1.9–3.5)
GLUCOSE SERPL-MCNC: 215 MG/DL (ref 65–99)
HCT VFR BLD AUTO: 29.8 % (ref 42–52)
HGB BLD-MCNC: 9.6 G/DL (ref 14–18)
MCH RBC QN AUTO: 29.2 PG (ref 27–33)
MCHC RBC AUTO-ENTMCNC: 32.2 G/DL (ref 33.7–35.3)
MCV RBC AUTO: 90.6 FL (ref 81.4–97.8)
PLATELET # BLD AUTO: 140 K/UL (ref 164–446)
PMV BLD AUTO: 11 FL (ref 9–12.9)
POTASSIUM SERPL-SCNC: 3.9 MMOL/L (ref 3.6–5.5)
PROT SERPL-MCNC: 4.6 G/DL (ref 6–8.2)
RBC # BLD AUTO: 3.29 M/UL (ref 4.7–6.1)
SODIUM SERPL-SCNC: 139 MMOL/L (ref 135–145)
WBC # BLD AUTO: 5.5 K/UL (ref 4.8–10.8)

## 2019-06-17 PROCEDURE — 700102 HCHG RX REV CODE 250 W/ 637 OVERRIDE(OP): Performed by: STUDENT IN AN ORGANIZED HEALTH CARE EDUCATION/TRAINING PROGRAM

## 2019-06-17 PROCEDURE — A9270 NON-COVERED ITEM OR SERVICE: HCPCS | Performed by: STUDENT IN AN ORGANIZED HEALTH CARE EDUCATION/TRAINING PROGRAM

## 2019-06-17 PROCEDURE — 99239 HOSP IP/OBS DSCHRG MGMT >30: CPT | Mod: GC | Performed by: INTERNAL MEDICINE

## 2019-06-17 PROCEDURE — 80053 COMPREHEN METABOLIC PANEL: CPT

## 2019-06-17 PROCEDURE — 85027 COMPLETE CBC AUTOMATED: CPT

## 2019-06-17 RX ORDER — HYDROCORTISONE 10 MG/1
10 TABLET ORAL EVERY EVENING
Status: DISCONTINUED | OUTPATIENT
Start: 2019-06-17 | End: 2019-06-17 | Stop reason: HOSPADM

## 2019-06-17 RX ORDER — INSULIN GLARGINE 100 [IU]/ML
15 INJECTION, SOLUTION SUBCUTANEOUS DAILY
Qty: 10 ML | Refills: 0 | Status: SHIPPED | OUTPATIENT
Start: 2019-06-17

## 2019-06-17 RX ADMIN — HYDROCORTISONE 10 MG: 10 TABLET ORAL at 05:10

## 2019-06-17 RX ADMIN — VITAMIN C 1 TABLET: TAB at 05:10

## 2019-06-17 NOTE — CARE PLAN
Problem: Medication  Goal: Compliance with prescribed medication will improve  Outcome: PROGRESSING AS EXPECTED      Problem: Knowledge Deficit  Goal: Knowledge of disease process/condition, treatment plan, diagnostic tests, and medications will improve  Outcome: PROGRESSING AS EXPECTED      Problem: Communication  Goal: The ability to communicate needs accurately and effectively will improve  Outcome: PROGRESSING AS EXPECTED

## 2019-06-17 NOTE — DISCHARGE INSTRUCTIONS
F/U with PCP in 1 week to monitor blood sugar and platelet count. (DC after appointment has been scheduled)   If notice any bleeding / easy bruising, please come back to ER / Call PCP office   If morning blood sugar is > 150 in 3 consecutive days, increase Lantus by 3 points on day 4.    Discharge Instructions    Discharged to home by car with relative. Discharged via wheelchair, hospital escort: Yes.  Special equipment needed: Not Applicable    Be sure to schedule a follow-up appointment with your primary care doctor or any specialists as instructed.     Discharge Plan:   Diet Plan: Discussed  Activity Level: Discussed  Confirmed Follow up Appointment: Patient to Call and Schedule Appointment  Confirmed Symptoms Management: Discussed  Medication Reconciliation Updated: Yes  Influenza Vaccine Indication: Patient Refuses    I understand that a diet low in cholesterol, fat, and sodium is recommended for good health. Unless I have been given specific instructions below for another diet, I accept this instruction as my diet prescription.   Other diet: Heart healthy     Special Instructions: None    · Is patient discharged on Warfarin / Coumadin?   No     Depression / Suicide Risk    As you are discharged from this [facility] facility, it is important to learn how to keep safe from harming yourself.    Recognize the warning signs:  · Abrupt changes in personality, positive or negative- including increase in energy   · Giving away possessions  · Change in eating patterns- significant weight changes-  positive or negative  · Change in sleeping patterns- unable to sleep or sleeping all the time   · Unwillingness or inability to communicate  · Depression  · Unusual sadness, discouragement and loneliness  · Talk of wanting to die  · Neglect of personal appearance   · Rebelliousness- reckless behavior  · Withdrawal from people/activities they love  · Confusion- inability to concentrate     If you or a loved one observes any  of these behaviors or has concerns about self-harm, here's what you can do:  · Talk about it- your feelings and reasons for harming yourself  · Remove any means that you might use to hurt yourself (examples: pills, rope, extension cords, firearm)  · Get professional help from the community (Mental Health, Substance Abuse, psychological counseling)  · Do not be alone:Call your Safe Contact- someone whom you trust who will be there for you.  · Call your local CRISIS HOTLINE 540-5375 or 822-162-8514  · Call your local Children's Mobile Crisis Response Team [place] (575) 615-5295 or www.[employer]  · Call the toll free National Suicide Prevention Hotlines   · National Suicide Prevention Lifeline 961-448-BPUC (1044)  · National Hope Line Network 800-SUICIDE (421-9919)

## 2019-06-17 NOTE — PROGRESS NOTES
Pt's wife at bedside.  IV dc'd.  Discharge instructions given to patient; patient verbalizes understanding, all questions answered.  Copy of DC summary provided, signed copy in chart.  1 prescription electronically sent to pt's pharmacy.  Pt states personal belongings are in possession.  Pt escorted off unit by this RN without incident.

## 2019-06-17 NOTE — PROGRESS NOTES
A/o,assessment completed,poc discussed,verbalized understanding tolerating po well, denies any discomfort, sob,dizziness, SB on the monitor hr=57, ambulated to the bathroom with steady gait, call button within reach,will continue to monitor.

## 2019-06-17 NOTE — PROGRESS NOTES
Assessment completed.  Pt A&Ox4.  Respirations even, unlabored on room air.  Pt denies any pain at this time.  POC discussed: awaiting hematology consult; communication board updated.  Call light and belongings within reach.  Needs met, will continue to monitor

## 2019-06-17 NOTE — DISCHARGE PLANNING
Care Transition Team Assessment    Spoke with patient at bedside. Anticipate no needs @ present time. Spouse will be ride @ D/C.    Information Source  Orientation : Oriented x 4  Information Given By: Patient    Readmission Evaluation  Is this a readmission?: No    Elopement Risk  Legal Hold: No  Ambulatory or Self Mobile in Wheelchair: Yes  Disoriented: No  Psychiatric Symptoms: None  History of Wandering: No  Elopement this Admit: No  Vocalizing Wanting to Leave: No  Displays Behaviors, Body Language Wanting to Leave: No-Not at Risk for Elopement  Elopement Risk: Not at Risk for Elopement    Interdisciplinary Discharge Planning  Does Admitting Nurse Feel This Could be a Complex Discharge?: No  Primary Care Physician: Stef @ [facility]  Lives with - Patient's Self Care Capacity: Spouse  Patient or legal guardian wants to designate a caregiver (see row info): No  Support Systems: Spouse / Significant Other  Housing / Facility: 1 Story Apartment / Condo  Do You Take your Prescribed Medications Regularly: Yes  Able to Return to Previous ADL's: Yes  Mobility Issues: No  Prior Services: Home-Independent  Patient Expects to be Discharged to:: Home  Assistance Needed: No  Durable Medical Equipment: Not Applicable    Discharge Preparedness  What are your discharge supports?: Spouse  Prior Functional Level: Ambulatory    Functional Assesment  Prior Functional Level: Ambulatory    Finances  Prescription Coverage: Yes    Vision / Hearing Impairment  Right Eye Vision: Wears Glasses, Wears Contacts  Left Eye Vision: Wears Glasses, Wears Contacts              Domestic Abuse  Have you ever been the victim of abuse or violence?: No              Anticipated Discharge Information  Anticipated discharge disposition: Home  Discharge Address: [address] [place] [address]  Discharge Contact Phone Number: [phone]

## 2019-06-17 NOTE — DISCHARGE SUMMARY
"        Internal Medicine Discharge Summary  Note Author: Matthew Castro M.D.       Name Aryan Dominguez     1950   Age/Sex 69 y.o. male   MRN [id number]         Admit Date:  2019       Discharge Date:   2019    Service:   [facility] Internal Medicine Purple Team  Attending Physician(s):   Dr. Barry       Senior Resident(s):   Dr. Castro  Mesfin Resident(s):   Dr. Solorio  PCP: Pcp Not In Computer      Primary Diagnosis:   Thrombocytopenia    Secondary Diagnoses:                Principal Problem:    Thrombocytopenia (HCC) POA: Unknown  Active Problems:    Type 2 diabetes mellitus with hyperglycemia, without long-term current use of insulin (HCC) POA: Yes    Hypotension due to hypovolemia POA: Unknown  Resolved Problems:    * No resolved hospital problems. *      Hospital Summary (Brief Narrative):       Mr. Aryan Waters is a 69 year old Type 2 diabetic male who was sent from his primary care physician for decreased platelet count.  Patient was recently admitted in the hospital for 2 days 9 days prior to this encounter for DKA secondary to nonadherence to his medication. New medications in the last 2 weeks include the Lantus. Pt reoprted of having bilateral knee, elbow, severe diaphoresis, \" shaking\", lethargy, vomiting/nausea as well as night sweat that started 4 days ago.  He was given low-dose steroids in the outpatient setting for joint pain about 10 days ago. Labs in ED found that pt has low platelet count of 30, down from 149 back in 19, pt otherwise denies any chest pain, difficulty of breathing, leg pain, leg swelling, abdominal pain, bleeding from any sites, skin rash, or mouth lesions. Denies family hx of bleeding/platelet issues. From his symptoms that he had recently, most likely thought process was attributing to a virus etiology, his work up was negative for HIV, hepatitis panel, CMV, heterophile Abs, HIT Ab test negative, clinical signs/symptoms and labs were not suggestive for " TTP/HUS, HIT or ITP. Abdominal ultrasound did not show hepatosplenomegaly. Pt's PLT number improved well without any specific intervention. Pt also had some hypotension with BP running in 90s, started on IVF and worked up for possibility of adrenal insufficiency, his serum cortisol was on low side and started on few doses of hydrocortisone with better response. Pt discharged with instruction given for PCP follow up and to repeat CBC in a week from discharge. Advised to return to ED if worsening of symptoms, bleeding from GI/ or skin rash develops. Advised to avoid NSAIDs, ASA or blood thinner for now. Pt understand and agreed to the plan.    Patient /Hospital Summary (Details -- Problem Oriented) :        * Thrombocytopenia (HCC)   Assessment & Plan     5 doses of UFH 5000u, last dose 9 days ago  4T's score: 6 points (high probability).  However, this is been ruled out by heparin antibody test  Potential recent viral illness given the symptoms of arthralgia(bilateral knee and elbows), episode of vomiting/nausea 4 days ago, severe diaphoresis nightly with 10 pound weight loss in the last week  No s/s of bleeding     HIT Ab test negative, HIV, Hep screen negaive  -SCDs, patient encouraged to ambulate   -Thrombocytopenia improving.  Platelets increased from 30s to 50s to 70's  - Lantus 15 units  - Abdominal ultrasound did not show hepatosplenomegaly     Type 2 diabetes mellitus with hyperglycemia, without long-term current use of insulin (HCC)- (present on admission)   Assessment & Plan     A1C: 10.6   Uncontrolled with polyneuropathy   -DM diet   Blood sugar 108 this morning.    -Lantus 15 units  -Hypoglycemia protocol       Hypotension due to hypovolemia   Assessment & Plan     Patient clinically appears dry with dry mucus membranes  Severe diaphoresis(soaking up entire bathrobe) waking up at night  Patient is asymptomatic              Consultants:     None     Procedures:        Non    Imaging/ Testing:       CT-RENAL WITH & W/O   Final Result      Cortical scarring of the right kidney.      Extrarenal pelvis is seen on the right. No cyst or renal mass is identified. No evidence of hydronephrosis.      Trace left pleural effusion. Bibasilar atelectasis. Interstitial prominence at the lung bases is likely related to chronic fibrotic changes.      Atherosclerotic plaque.      Fat-containing umbilical hernia.                  US-ABDOMEN COMPLETE SURVEY   Final Result      Spleen is not enlarged.      Common duct is at the upper limits of normal in diameter.      Pancreas and proximal aorta are obscured, limiting evaluation.      Hypoechoic nodule adjacent to the inferior right kidney could represent an exophytic cyst but a solid mass is not excluded. Further evaluation with CT is recommended.      Multiple bladder diverticula.               US-EXTREMITY VENOUS LOWER BILAT   Final Result      DX-CHEST-2 VIEWS   Final Result      No acute cardiopulmonary abnormality.            Discharge Medications:         Medication Reconciliation: Deferred       Medication List      CHANGE how you take these medications      Instructions   insulin glargine 100 UNIT/ML Soln  What changed:  · how much to take  · when to take this  Commonly known as:  LANTUS   Inject 15 Units as instructed every day.  Dose:  15 Units        CONTINUE taking these medications      Instructions   atorvastatin 20 MG Tabs  Commonly known as:  LIPITOR   Take 1 Tab by mouth every day.  Dose:  20 mg     Ginseng 50 MG Caps   Take 50 mg by mouth every day.  Dose:  50 mg     metformin 1000 MG tablet  Commonly known as:  GLUCOPHAGE   Take 1,000 mg by mouth 2 times a day, with meals.  Dose:  1000 mg     Pumpkin Seed 500 MG Tabs   Take 500 mg by mouth every day.  Dose:  500 mg     Vitamin B Complex Tabs   Take 1 Tab by mouth every day.  Dose:  1 Tab          Disposition:   Home     Diet:  Diabetic     Activity:   As tolerated     Instructions:      Pt discharged with  instruction given for PCP follow up and to repeat CBC in a week from discharge. Advised to return to ED if worsening of symptoms, bleeding from GI/ or skin rash develops. Advised to avoid NSAIDs, ASA or blood thinner for now. Pt understand and agreed to the plan.     The patient was instructed to return to the ER in the event of worsening symptoms. I have counseled the patient on the importance of compliance and the patient has agreed to proceed with all medical recommendations and follow up plan indicated above.   The patient understands that all medications come with benefits and risks. Risks may include permanent injury or death and these risks can be minimized with close reassessment and monitoring.        Primary Care Provider:   Pt mentioned of having PCP as below     Follow up appointment details :      No future appointments.  Paulette Finch    Go on 6/24/2019  At 3pm as previously scheduled.      Pending Studies:        Serotonin release assay  Parvovirus 19  Time spent on discharge day patient visit, preparing discharge paperwork and arranging for patient follow up.    Discharge Time (Minutes) :  55 minutes   Hospital Course Type:  Inpatient Stay >2 midnights      Condition on Discharge; stable     ______________________________________________________________________    Interval history/exam for day of discharge:    Review of Systems   Constitutional: Negative for fever  HENT: Negative for hearing loss and tinnitus.    Eyes: Negative for blurred vision and double vision.   Respiratory: Negative for cough and hemoptysis.    Cardiovascular: Negative for chest pain and palpitations.   Gastrointestinal: Negative for diarrhea, N/V, abdominal pain, blood in stool, heartburn and melena.   Genitourinary: Negative for dysuria and urgency.   Musculoskeletal: Negative for back pain and myalgias.   Neurological: Negative for dizziness and headaches.   Endo/Heme/Allergies: Negative for environmental allergies. Does  not bruise/bleed easily.   Psychiatric/Behavioral: Negative for depression and suicidal ideas.      Physical Exam   Constitutional: He is oriented to person, place, and time and well-developed, well-nourished, and in no distress. No distress.   HENT:   Head: Normocephalic and atraumatic.   Eyes: Pupils are equal, round, and reactive to light. EOM are normal. Right eye exhibits no discharge.   Neck: Normal range of motion. Neck supple. No thyromegaly present.   Cardiovascular: Normal rate and regular rhythm.    No murmur heard.  Pulmonary/Chest: Effort normal and breath sounds normal. No respiratory distress.   Abdominal: Soft. Bowel sounds are normal. He exhibits no distension.   Musculoskeletal: Normal range of motion. He exhibits no edema or deformity.   Neurological: He is alert and oriented to person, place, and time.   Skin: Skin is warm and dry. He is not diaphoretic. No erythema.   Psychiatric: Mood, memory, affect and judgment normal.    Most Recent Labs:    Lab Results   Component Value Date/Time    WBC 5.5 06/17/2019 05:16 AM    RBC 3.29 (L) 06/17/2019 05:16 AM    HEMOGLOBIN 9.6 (L) 06/17/2019 05:16 AM    HEMATOCRIT 29.8 (L) 06/17/2019 05:16 AM    MCV 90.6 06/17/2019 05:16 AM    MCH 29.2 06/17/2019 05:16 AM    MCHC 32.2 (L) 06/17/2019 05:16 AM    MPV 11.0 06/17/2019 05:16 AM    NEUTSPOLYS 71.90 06/16/2019 02:36 AM    LYMPHOCYTES 20.20 (L) 06/16/2019 02:36 AM    MONOCYTES 4.40 06/16/2019 02:36 AM    EOSINOPHILS 1.70 06/16/2019 02:36 AM    BASOPHILS 1.80 06/16/2019 02:36 AM    HYPOCHROMIA 1+ 06/14/2019 04:07 AM    ANISOCYTOSIS 1+ 06/14/2019 04:07 AM      Lab Results   Component Value Date/Time    SODIUM 139 06/17/2019 05:16 AM    POTASSIUM 3.9 06/17/2019 05:16 AM    CHLORIDE 109 06/17/2019 05:16 AM    CO2 26 06/17/2019 05:16 AM    GLUCOSE 215 (H) 06/17/2019 05:16 AM    BUN 12 06/17/2019 05:16 AM    CREATININE 0.80 06/17/2019 05:16 AM      Lab Results   Component Value Date/Time    ALTSGPT 11 06/17/2019 05:16  AM    ASTSGOT 9 (L) 06/17/2019 05:16 AM    ALKPHOSPHAT 79 06/17/2019 05:16 AM    TBILIRUBIN 0.4 06/17/2019 05:16 AM    ALBUMIN 2.3 (L) 06/17/2019 05:16 AM    GLOBULIN 2.3 06/17/2019 05:16 AM    INR 1.03 06/14/2019 07:27 AM     Lab Results   Component Value Date/Time    PROTHROMBTM 13.7 06/14/2019 07:27 AM    INR 1.03 06/14/2019 07:27 AM

## 2019-06-18 LAB
ANNOTATION COMMENT IMP: NOT DETECTED
B19V DNA SERPL NAA+PROBE-ACNC: <100 IU/ML
B19V DNA SERPL NAA+PROBE-LOG IU: <2 LOG IU/ML
SPECIMEN SOURCE: NORMAL
SRA 0.1IU/ML UFH SER-ACNC: 0 %
SRA 100IU/ML UFH SER-ACNC: 0 %
SRA PORCINE INTERP SER-IMP: NEGATIVE
SRA UFH SER-IMP: NORMAL

## 2019-06-19 LAB
TWHIPPLEI PCR Q0087: NOT DETECTED
TWHIPPLEI PCR SRC Q0086: NORMAL

## 2019-11-08 ENCOUNTER — OFFICE VISIT (OUTPATIENT)
Dept: CARDIOLOGY | Facility: MEDICAL CENTER | Age: 69
End: 2019-11-08
Payer: COMMERCIAL

## 2019-11-08 VITALS
WEIGHT: 163 LBS | HEIGHT: 70 IN | OXYGEN SATURATION: 94 % | SYSTOLIC BLOOD PRESSURE: 122 MMHG | HEART RATE: 74 BPM | BODY MASS INDEX: 23.34 KG/M2 | DIASTOLIC BLOOD PRESSURE: 80 MMHG

## 2019-11-08 DIAGNOSIS — E11.65 TYPE 2 DIABETES MELLITUS WITH HYPERGLYCEMIA, WITH LONG-TERM CURRENT USE OF INSULIN (HCC): ICD-10-CM

## 2019-11-08 DIAGNOSIS — I10 HTN (HYPERTENSION), MALIGNANT: ICD-10-CM

## 2019-11-08 DIAGNOSIS — Z79.4 TYPE 2 DIABETES MELLITUS WITH HYPERGLYCEMIA, WITH LONG-TERM CURRENT USE OF INSULIN (HCC): ICD-10-CM

## 2019-11-08 DIAGNOSIS — E78.5 DYSLIPIDEMIA: ICD-10-CM

## 2019-11-08 DIAGNOSIS — R42 LIGHTHEADEDNESS: ICD-10-CM

## 2019-11-08 LAB — EKG IMPRESSION: NORMAL

## 2019-11-08 PROCEDURE — 93000 ELECTROCARDIOGRAM COMPLETE: CPT | Performed by: INTERNAL MEDICINE

## 2019-11-08 PROCEDURE — 99214 OFFICE O/P EST MOD 30 MIN: CPT | Performed by: INTERNAL MEDICINE

## 2019-11-08 RX ORDER — UBIDECARENONE 75 MG
100 CAPSULE ORAL DAILY
COMMUNITY

## 2019-11-08 RX ORDER — FLAVORING AGENT
LIQUID (ML) MISCELLANEOUS
COMMUNITY

## 2019-11-08 ASSESSMENT — ENCOUNTER SYMPTOMS
HALLUCINATIONS: 0
CHILLS: 0
ABDOMINAL PAIN: 0
PALPITATIONS: 0
BLOOD IN STOOL: 0
DOUBLE VISION: 0
HEADACHES: 0
EYE DISCHARGE: 0
BLURRED VISION: 0
WEIGHT LOSS: 0
SPEECH CHANGE: 0
COUGH: 0
DIZZINESS: 0
ORTHOPNEA: 0
SENSORY CHANGE: 0
MYALGIAS: 0
PND: 0
FALLS: 0
NAUSEA: 0
SHORTNESS OF BREATH: 0
FEVER: 0
BRUISES/BLEEDS EASILY: 0
CLAUDICATION: 0
LOSS OF CONSCIOUSNESS: 0
DEPRESSION: 0
EYE PAIN: 0
VOMITING: 0

## 2019-11-08 NOTE — PROGRESS NOTES
Chief Complaint   Patient presents with   • Hyperlipidemia       Subjective:   Aryan Waters is a 69 y.o. male who presents today and does not know why he is here. He denies having chest pain, dyspnea.     He does endorse of having some lightheadedness while he plays cards.    I have personally interpreted her EKG today with patient, there is no evidence of acute coronary syndrome, no evidence of prior infarct, normal LA and QT interval, no significant conduction disease.    I have independently interpreted and reviewed echocardiogram's actual images with patient which showed normal left ventricular systolic function. No wall motion abnormality. No evidence of pulmonary hypertension. No significant valvular disease.        Past Medical History:   Diagnosis Date   • Diabetes (HCC)      No past surgical history on file.  No family history on file.  Social History     Socioeconomic History   • Marital status:      Spouse name: Not on file   • Number of children: Not on file   • Years of education: Not on file   • Highest education level: Not on file   Occupational History   • Not on file   Social Needs   • Financial resource strain: Not on file   • Food insecurity:     Worry: Not on file     Inability: Not on file   • Transportation needs:     Medical: Not on file     Non-medical: Not on file   Tobacco Use   • Smoking status: Former Smoker     Types: Cigarettes     Last attempt to quit: 1989     Years since quittin.4   • Smokeless tobacco: Never Used   Substance and Sexual Activity   • Alcohol use: No   • Drug use: No   • Sexual activity: Not on file   Lifestyle   • Physical activity:     Days per week: Not on file     Minutes per session: Not on file   • Stress: Not on file   Relationships   • Social connections:     Talks on phone: Not on file     Gets together: Not on file     Attends [employer] service: Not on file     Active member of club or organization: Not on file     Attends meetings of  clubs or organizations: Not on file     Relationship status: Not on file   • Intimate partner violence:     Fear of current or ex partner: Not on file     Emotionally abused: Not on file     Physically abused: Not on file     Forced sexual activity: Not on file   Other Topics Concern   • Not on file   Social History Narrative   • Not on file     No Known Allergies  Outpatient Encounter Medications as of 11/8/2019   Medication Sig Dispense Refill   • APPLE CIDER VINEGAR PO Take  by mouth.     • Flavoring Agent (LEMON FLAVOR) Liquid by Does not apply route.     • HONEY PO Take  by mouth.     • Red Yeast Rice Extract (RED YEAST RICE PO) Take  by mouth.     • VITAMIN B COMPLEX-C PO Take  by mouth.     • cyanocobalamin (VITAMIN B-12) 100 MCG Tab Take 100 mcg by mouth every day.     • Milk Thistle 1000 MG Cap Take  by mouth.     • insulin glargine (LANTUS) 100 UNIT/ML Solution Inject 15 Units as instructed every day. 10 mL 0   • metformin (GLUCOPHAGE) 1000 MG tablet Take 1,000 mg by mouth 2 times a day, with meals.     • B Complex Vitamins (VITAMIN B COMPLEX) Tab Take 1 Tab by mouth every day.     • Ginseng 50 MG Cap Take 50 mg by mouth every day.     • Pumpkin Seed 500 MG Tab Take 500 mg by mouth every day.     • metformin (GLUCOPHAGE) 1000 MG tablet Take 1,000 mg by mouth.     • atorvastatin (LIPITOR) 20 MG Tab Take 1 Tab by mouth every day. 30 Tab 2     No facility-administered encounter medications on file as of 11/8/2019.      Review of Systems   Constitutional: Negative for chills, fever, malaise/fatigue and weight loss.   HENT: Negative for ear discharge, ear pain, hearing loss and nosebleeds.    Eyes: Negative for blurred vision, double vision, pain and discharge.   Respiratory: Negative for cough and shortness of breath.    Cardiovascular: Negative for chest pain, palpitations, orthopnea, claudication, leg swelling and PND.   Gastrointestinal: Negative for abdominal pain, blood in stool, melena, nausea and  "vomiting.   Genitourinary: Negative for dysuria and hematuria.   Musculoskeletal: Negative for falls, joint pain and myalgias.   Skin: Negative for itching and rash.   Neurological: Negative for dizziness, sensory change, speech change, loss of consciousness and headaches.   Endo/Heme/Allergies: Negative for environmental allergies. Does not bruise/bleed easily.   Psychiatric/Behavioral: Negative for depression, hallucinations and suicidal ideas.        Objective:   /80 (BP Location: Left arm, Patient Position: Sitting)   Pulse 74   Ht 1.778 m (5' 10\")   Wt 73.9 kg (163 lb)   SpO2 94%   BMI 23.39 kg/m²     Physical Exam   Constitutional: He is oriented to person, place, and time. No distress.   HENT:   Head: Normocephalic and atraumatic.   Right Ear: External ear normal.   Left Ear: External ear normal.   Eyes: Right eye exhibits no discharge. Left eye exhibits no discharge.   Neck: No JVD present. No thyromegaly present.   Cardiovascular: Normal rate, regular rhythm, normal heart sounds and intact distal pulses. Exam reveals no gallop and no friction rub.   No murmur heard.  Pulmonary/Chest: Breath sounds normal. No respiratory distress.   Abdominal: Bowel sounds are normal. He exhibits no distension. There is no tenderness.   Musculoskeletal:         General: No tenderness or edema.   Neurological: He is alert and oriented to person, place, and time. No cranial nerve deficit.   Skin: Skin is warm and dry. He is not diaphoretic.   Psychiatric: He has a normal mood and affect. His behavior is normal.   Nursing note and vitals reviewed.      Assessment:     1. Lightheadedness  EKG    EKG   2. HTN (hypertension), malignant     3. Dyslipidemia     4. Type 2 diabetes mellitus with hyperglycemia, with long-term current use of insulin (HCC)         Medical Decision Making:  Today's Assessment / Status / Plan:   Blood pressure is well controlled.  Since patient is asymptomatic at this time, I do not think that " further cardiac work-up is going to be beneficial for him overall.  Therefore, at this time we will optimize medical therapy for his risk factors including hypertension, diabetes and dyslipidemia.

## 2020-03-20 NOTE — ADDENDUM NOTE
Encounter addended by: Mariana Masterson R.N. on: 3/20/2020 10:42 AM   Actions taken: Flowsheet accepted

## 2021-03-05 NOTE — PROGRESS NOTES
Bedside report received, pt care assumed, tele box on.  Pt denies any additional needs at this time. Bed in lowest position, bed alarm on pt educated on fall risk and verbalized understanding, call light within reach.     No Yes

## 2024-05-16 NOTE — PROGRESS NOTES
Assessment completed.  Pt A&Ox4.  Respirations even, unlabored on room air.   Pt denies any pain at this time.  Sinus bradycardia noted on telemetry monitor, pt asymptomatic.  IVF and potassium infusing per MAR.  POC discussed; communication board updated.    Fall precaution in place: arm band, socks, signs, etc.  Call light and belongings within reach.  Needs met, will continue to monitor.     Preceptor Attestation:  The longitudinal plan of care for the diagnosis(es)/condition(s) as documented were addressed during this visit. Due to the added complexity in care, I will continue to support Bhavana Munguia in the subsequent management and with ongoing continuity of care.    I discussed the patient with the resident and evaluated the patient in person. I have verified the content of the note, which accurately reflects my assessment of the patient and the plan of care.   Supervising Physician:  David Lewis MD.